# Patient Record
Sex: MALE | Race: WHITE | URBAN - METROPOLITAN AREA
[De-identification: names, ages, dates, MRNs, and addresses within clinical notes are randomized per-mention and may not be internally consistent; named-entity substitution may affect disease eponyms.]

---

## 2017-01-30 DIAGNOSIS — E78.5 HYPERLIPIDEMIA LDL GOAL <130: Primary | ICD-10-CM

## 2017-01-30 RX ORDER — ATORVASTATIN CALCIUM 40 MG/1
40 TABLET, FILM COATED ORAL DAILY
Qty: 90 TABLET | Refills: 0 | Status: SHIPPED | OUTPATIENT
Start: 2017-01-30 | End: 2017-04-29

## 2017-02-02 DIAGNOSIS — I10 HYPERTENSION GOAL BP (BLOOD PRESSURE) < 140/90: Primary | ICD-10-CM

## 2017-02-02 RX ORDER — AMLODIPINE BESYLATE 5 MG/1
5 TABLET ORAL DAILY
Qty: 90 TABLET | Refills: 1 | Status: SHIPPED | OUTPATIENT
Start: 2017-02-02 | End: 2017-08-21

## 2017-02-27 DIAGNOSIS — K29.70 GASTRITIS: ICD-10-CM

## 2017-02-27 DIAGNOSIS — I10 HYPERTENSION GOAL BP (BLOOD PRESSURE) < 140/90: ICD-10-CM

## 2017-02-27 RX ORDER — ATENOLOL 100 MG/1
100 TABLET ORAL DAILY
Qty: 90 TABLET | Refills: 1 | Status: SHIPPED | OUTPATIENT
Start: 2017-02-27 | End: 2017-08-21

## 2017-03-01 ENCOUNTER — OFFICE VISIT (OUTPATIENT)
Dept: OPTOMETRY | Facility: CLINIC | Age: 62
End: 2017-03-01
Payer: COMMERCIAL

## 2017-03-01 DIAGNOSIS — H25.041 CATARACT, POST SUBCAPSULAR POLAR SENILE, RIGHT: Primary | ICD-10-CM

## 2017-03-01 DIAGNOSIS — H52.203 ASTIGMATISM OF BOTH EYES: ICD-10-CM

## 2017-03-01 DIAGNOSIS — H52.4 PRESBYOPIA: ICD-10-CM

## 2017-03-01 DIAGNOSIS — H52.11 MYOPIA, RIGHT EYE: ICD-10-CM

## 2017-03-01 PROCEDURE — 92004 COMPRE OPH EXAM NEW PT 1/>: CPT | Performed by: OPTOMETRIST

## 2017-03-01 PROCEDURE — 92015 DETERMINE REFRACTIVE STATE: CPT | Performed by: OPTOMETRIST

## 2017-03-01 ASSESSMENT — REFRACTION_MANIFEST
OD_SPHERE: -6.75
OD_AXIS: 125
OS_CYLINDER: +0.50
OS_AXIS: 086
OS_CYLINDER: +0.50
OS_AXIS: 100
OD_AXIS: 124
OD_ADD: +2.50
OS_SPHERE: +0.25
OD_CYLINDER: +1.25
OD_SPHERE: -6.75
OS_ADD: +2.50
OS_SPHERE: PLANO
OD_CYLINDER: +1.00

## 2017-03-01 ASSESSMENT — TONOMETRY
IOP_METHOD: APPLANATION
OD_IOP_MMHG: 14
OS_IOP_MMHG: 12

## 2017-03-01 ASSESSMENT — EXTERNAL EXAM - RIGHT EYE: OD_EXAM: NORMAL

## 2017-03-01 ASSESSMENT — VISUAL ACUITY
OD_PH_SC: 20/150-1
OS_CC: 20/25
OD_SC: 20/200
CORRECTION_TYPE: GLASSES
OS_SC: 20/25
OS_SC+: -1
OD_SC+: -1
OD_CC: 20/200
METHOD: SNELLEN - LINEAR

## 2017-03-01 ASSESSMENT — EXTERNAL EXAM - LEFT EYE: OS_EXAM: NORMAL

## 2017-03-01 ASSESSMENT — CUP TO DISC RATIO
OS_RATIO: 0.3
OD_RATIO: 0.2

## 2017-03-01 ASSESSMENT — REFRACTION_WEARINGRX
OS_SPHERE: +0.75
OD_CYLINDER: SPHERE
SPECS_TYPE: PAL
OD_ADD: +2.00
OD_SPHERE: -1.50
OS_CYLINDER: SPHERE
OS_ADD: +2.00

## 2017-03-01 ASSESSMENT — KERATOMETRY
OD_K1POWER_DIOPTERS: 44.00
OD_AXISANGLE2_DEGREES: 22
OD_K2POWER_DIOPTERS: 44.50
OS_AXISANGLE2_DEGREES: 160
OS_K1POWER_DIOPTERS: 44.25
OS_K2POWER_DIOPTERS: 45.25

## 2017-03-01 ASSESSMENT — SLIT LAMP EXAM - LIDS
COMMENTS: NORMAL
COMMENTS: NORMAL

## 2017-03-01 ASSESSMENT — CONF VISUAL FIELD
OD_NORMAL: 1
OS_NORMAL: 1

## 2017-03-01 NOTE — PROGRESS NOTES
Chief Complaint   Patient presents with     COMPREHENSIVE EYE EXAM     concerns with cataract in right eye, new to eye dept.         Last Eye Exam: 1-2 years ago   Dilated Previously: Yes    What are you currently using to see?  Glasses, wears mostly for near. Needing them more and more. Rx is not working for his right eye any longer, blurry        Distance Vision Acuity: Noticed gradual change in right eye, blurry. Glasses don't seem to work. Does not wear the glasses to drive.    Near Vision Acuity: Needs glasses for near tasks, but has noticed that the glasses aren't working well any longer. Right eye is worse, has been told that he has a cataract in that eye     Eye Comfort: good. Did mention that his right eye seems to water in the morning -  Not bothersome  Do you use eye drops? 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       OBJECTIVE: See Ophthalmology exam    ASSESSMENT:    ICD-10-CM    1. Cataract, post subcapsular polar senile, right H25.041 EYE EXAM (SIMPLE-NONBILLABLE)     REFRACTIVE STATUS     OPHTHALMOLOGY ADULT REFERRAL   2. Astigmatism of both eyes H52.203 EYE EXAM (SIMPLE-NONBILLABLE)     REFRACTIVE STATUS     OPHTHALMOLOGY ADULT REFERRAL   3. Presbyopia H52.4 EYE EXAM (SIMPLE-NONBILLABLE)     REFRACTIVE STATUS     OPHTHALMOLOGY ADULT REFERRAL   4. Myopia, right eye H52.11 EYE EXAM (SIMPLE-NONBILLABLE)     REFRACTIVE STATUS     OPHTHALMOLOGY ADULT REFERRAL      PLAN:     Patient Instructions   Patient was advised of today's exam findings.  Advised not to update glasses   Return in 1 year for eye exam  Refer to [facility] [place] for cataract evaluation right eye  Their office will call you to schedule appointment.   Please call [phone] if you have not heard from them within 1 week.    Elia Chavez and  George  [facility]  [address] [place] [address]   [phone]    Blanka Huang O.D.  [facility]   [address] [place], [place] [id number] [phone]

## 2017-03-01 NOTE — MR AVS SNAPSHOT
After Visit Summary   3/1/2017    Ishmael Whitman    MRN: [id number]           Patient Information     Date Of Birth          1955        Visit Information        Provider Department      3/1/2017 9:30 AM Blanka Huang, OD [facility]        Today's Diagnoses     Cataract, post subcapsular polar senile, right    -  1    Astigmatism of both eyes        Presbyopia        Myopia, right eye          Care Instructions    Patient was advised of today's exam findings.  Advised not to update glasses   Return in 1 year for eye exam  Refer to [facility] at [place] for cataract evaluation right eye  Their office will call you to schedule appointment.   Please call [phone] if you have not heard from them within 1 week.    Elia Tobin  [facility]  [address] [place] [address] [phone]    Blanka Huang O.D.  [facility]   [address] [place] [address] [phone]            Follow-ups after your visit        Additional Services     OPHTHALMOLOGY ADULT REFERRAL       Your provider has referred you to:  [facility], [facility]  [phone]    [email or web address]        Please be aware that coverage of these services is subject to the terms and limitations of your health insurance plan.  Call member services at your health plan with any benefit or coverage questions.      Please bring the following to your appointment:  >>   Any x-rays, CTs or MRIs which have been performed.  Contact the facility where they were done to arrange for  prior to your scheduled appointment.  Any new CT, MRI or other procedures ordered by your specialist must be performed at a [facility] facility or coordinated by your clinic's referral office.    >>   List of current medications   >>   This referral request   >>   Any documents/labs given to you for this referral                   Who to contact     If you have questions or need follow up information about today's clinic visit or your schedule please contact [facility] directly at [phone].  Normal or non-critical lab and imaging results will be communicated to you by MyChart, letter or phone within 4 business days after the clinic has received the results. If you do not hear from us within 7 days, please contact the clinic through Thumbtackhart or phone. If you have a critical or abnormal lab result, we will notify you by phone as soon as possible.  Submit refill requests through Ziffi or call your pharmacy and they will forward the refill request to us. Please allow 3 business days for your refill to be completed.          Additional Information About Your Visit        Ziffi Information     Ziffi gives you secure access to your electronic health record. If you see a primary care provider, you can also send messages to your care team and make appointments. If you have questions, please call your primary care clinic.  If you do not have a primary care provider, please call [phone] and they will assist you.        Care EveryWhere ID     This is your Care EveryWhere ID. This could be used by other organizations to access your [facility] medical records  [id number]         Blood Pressure from Last 3 Encounters:   06/09/16 131/82   12/17/15 157/84   08/25/15 137/88    Weight from Last 3 Encounters:   06/09/16 94.3 kg (208 lb)   12/17/15 92.5 kg (204 lb)   08/25/15 93 kg (205 lb)              We Performed the Following     EYE EXAM (SIMPLE-NONBILLABLE)     OPHTHALMOLOGY ADULT REFERRAL     REFRACTIVE STATUS        Primary Care Provider Office Phone # Fax #    Giovanni Calixto [phone]       [facility] [address] [facility] [address]        Thank you!     Thank you for choosing [facility]  for your care. Our goal is always to provide you with excellent care.  Hearing back from our patients is one way we can continue to improve our services. Please take a few minutes to complete the written survey that you may receive in the mail after your visit with us. Thank you!             Your Updated Medication List - Protect others around you: Learn how to safely use, store and throw away your medicines at www.disposemymeds.org.          This list is accurate as of: 3/1/17 10:18 AM.  Always use your most recent med list.                   Brand Name Dispense Instructions for use    amLODIPine 5 MG tablet    NORVASC    90 tablet    Take 1 tablet (5 mg) by mouth daily for blood pressure take in AM       aspirin 81 MG tablet      Take 1 tablet by mouth daily.       atenolol 100 MG tablet    TENORMIN    90 tablet    Take 1 tablet (100 mg) by mouth daily At bedtime for blood pressure.       atorvastatin 40 MG tablet    LIPITOR    90 tablet    Take 1 tablet (40 mg) by mouth daily For Cholesterol       buPROPion 150 MG 12 hr tablet    WELLBUTRIN SR    60 tablet    Take 1 tablet once daily and increase to 1 tablet twice daily after  7 days for smoking.       ibuprofen 200 MG tablet    ADVIL/MOTRIN     200 mg. 4 tabs daily when needed       omeprazole 20 MG CR capsule    priLOSEC    90 capsule    Take 1 capsule (20 mg) by mouth daily as needed To protect stomach       triamterene-hydrochlorothiazide 37.5-25 MG per capsule    DYAZIDE    135 capsule    One tab blood pressure In AM (ok for one extra pill daily if needed for edema).       varenicline 0.5 MG X 11 & 1 MG X 42 tablet    CHANTIX STARTING MONTH MELECIO    53 tablet    Take 0.5 mg tab daily for 3 days, then 0.5 mg tab twice daily for 4 days, then 1 mg twice daily. For smoking

## 2017-03-01 NOTE — PATIENT INSTRUCTIONS
Patient was advised of today's exam findings.  Advised not to update glasses   Return in 1 year for eye exam  Refer to [facility] [place] for cataract evaluation right eye  Their office will call you to schedule appointment.   Please call [phone] if you have not heard from them within 1 week.    Elia Chavez and Geogre  [facility]  [address] [place] [id number] [phone]    Blanka Huang O.D.  [facility]   [address] [place], [place] [id number] [phone]

## 2017-04-04 DIAGNOSIS — I10 ESSENTIAL HYPERTENSION WITH GOAL BLOOD PRESSURE LESS THAN 140/90: ICD-10-CM

## 2017-04-06 RX ORDER — TRIAMTERENE AND HYDROCHLOROTHIAZIDE 37.5; 25 MG/1; MG/1
CAPSULE ORAL
Qty: 135 CAPSULE | Refills: 0 | Status: SHIPPED | OUTPATIENT
Start: 2017-04-06 | End: 2017-09-19

## 2017-04-13 ENCOUNTER — OFFICE VISIT (OUTPATIENT)
Dept: OPHTHALMOLOGY | Facility: CLINIC | Age: 62
End: 2017-04-13
Payer: COMMERCIAL

## 2017-04-13 ENCOUNTER — TELEPHONE (OUTPATIENT)
Dept: OPHTHALMOLOGY | Facility: CLINIC | Age: 62
End: 2017-04-13

## 2017-04-13 DIAGNOSIS — H25.813 COMBINED FORM OF AGE-RELATED CATARACT, BOTH EYES: Primary | ICD-10-CM

## 2017-04-13 PROCEDURE — 92004 COMPRE OPH EXAM NEW PT 1/>: CPT | Performed by: STUDENT IN AN ORGANIZED HEALTH CARE EDUCATION/TRAINING PROGRAM

## 2017-04-13 RX ORDER — MONOCHLOROACETIC ACID
1 CRYSTALS MISCELLANEOUS 4 TIMES DAILY
Qty: 1 BOTTLE | Refills: 0 | Status: SHIPPED | OUTPATIENT
Start: 2017-05-16 | End: 2017-04-13

## 2017-04-13 RX ORDER — BROMFENAC SODIUM 100 %
1 POWDER (GRAM) MISCELLANEOUS 4 TIMES DAILY
Qty: 1 BOTTLE | Refills: 0 | Status: SHIPPED | OUTPATIENT
Start: 2017-04-13 | End: 2018-11-30

## 2017-04-13 RX ORDER — MONOCHLOROACETIC ACID
1 CRYSTALS MISCELLANEOUS 4 TIMES DAILY
Qty: 1 BOTTLE | Refills: 0 | Status: SHIPPED | OUTPATIENT
Start: 2017-04-30 | End: 2018-11-30

## 2017-04-13 RX ORDER — BROMFENAC SODIUM 100 %
1 POWDER (GRAM) MISCELLANEOUS 4 TIMES DAILY
Qty: 1 BOTTLE | Refills: 0 | Status: SHIPPED | OUTPATIENT
Start: 2017-04-13 | End: 2017-04-13

## 2017-04-13 RX ORDER — DEXAMETHASONE ACETATE, MICRO 100 %
1 POWDER (GRAM) MISCELLANEOUS 4 TIMES DAILY
Qty: 1 BOTTLE | Refills: 0 | Status: SHIPPED | OUTPATIENT
Start: 2017-04-13 | End: 2018-11-30

## 2017-04-13 RX ORDER — DEXAMETHASONE ACETATE, MICRO 100 %
1 POWDER (GRAM) MISCELLANEOUS 4 TIMES DAILY
Qty: 1 BOTTLE | Refills: 0 | Status: SHIPPED | OUTPATIENT
Start: 2017-04-13 | End: 2017-04-13

## 2017-04-13 ASSESSMENT — REFRACTION_MANIFEST
OD_CYLINDER: SPHERE
OD_SPHERE: -2.50

## 2017-04-13 ASSESSMENT — CUP TO DISC RATIO
OD_RATIO: 0.2
OS_RATIO: 0.2

## 2017-04-13 ASSESSMENT — REFRACTION_WEARINGRX
OD_ADD: +2.00
OS_SPHERE: +0.75
SPECS_TYPE: PAL
OS_ADD: +2.00
OS_CYLINDER: SPHERE
OD_SPHERE: -1.50
OD_CYLINDER: SPHERE

## 2017-04-13 ASSESSMENT — TONOMETRY
IOP_METHOD: APPLANATION
OS_IOP_MMHG: 14
OD_IOP_MMHG: 15

## 2017-04-13 ASSESSMENT — VISUAL ACUITY
OD_CC: 20/100
METHOD: SNELLEN - LINEAR
OS_CC: 20/20

## 2017-04-13 ASSESSMENT — SLIT LAMP EXAM - LIDS
COMMENTS: NORMAL
COMMENTS: NORMAL

## 2017-04-13 ASSESSMENT — EXTERNAL EXAM - RIGHT EYE: OD_EXAM: NORMAL

## 2017-04-13 ASSESSMENT — EXTERNAL EXAM - LEFT EYE: OS_EXAM: NORMAL

## 2017-04-13 NOTE — PATIENT INSTRUCTIONS
Visually significant cataract that is interfering with daily activities of living. Plan for cataract extraction and intraocular lens implant right eye.  Risks, benefits, complications, and alternatives discussed with patient including possibility of limitations from coexistent eye disease and loss of vision. Target refraction and lens options discussed.  Patient understands and wishes to proceed with surgery.    Jordana Vazquez MD  [phone]

## 2017-04-13 NOTE — PROGRESS NOTES
Current Eye Medications:  no     Subjective:  Cataract evaluation, Dr. Huang referral.  Pt reports his  Vision right  is very blurry, change in vision has been gradual over about the past year and a half. But the vision has really gotten worse in the past 6 months in the right eye.     Objective:  See Ophthalmology Exam.      Assessment:  Ishmael Whitman is a 62 year old male who presents with:     Combined form of age-related cataract, both eyes Visually significant cataract right eye with myopic shift.       Plan:  Visually significant cataract that is interfering with daily activities of living. Plan for cataract extraction and intraocular lens implant right eye.  Risks, benefits, complications, and alternatives discussed with patient including possibility of limitations from coexistent eye disease and loss of vision. Target refraction and lens options discussed.  Patient understands and wishes to proceed with surgery.    Visually significant cataract, Right  eye. Risks, benefits, alternatives of cataract surgery discussed; patient wishes to proceed with surgery.    Eye: Right   Pupil: 6.5  Pseudoexfoliation: No  Flomax: No  Diabetes mellitus: No  Glaucoma: No  Guttae: No  S/p refractive surgery or other eye surgery: No  Refractive target:  emmetropia - discuss (says he only used cheaters prior to developing cataract)  Anticoagulation: aspirin  Anesthesia: MAC/topical  Able to lay flat:  Yes  Additional concerns:   Difficulty: 3      Jordana Vazquez MD  [phone]

## 2017-04-13 NOTE — TELEPHONE ENCOUNTER
Tammy from [facility] called regarding an eye drop that was sent to the [facility] that they cannot fill. The Pharmacist has some questions regarding the prescription and would like a call back.    Please call [phone]. Thank you.

## 2017-04-13 NOTE — MR AVS SNAPSHOT
After Visit Summary   4/13/2017    Ishmael Whitman    MRN: [id number]           Patient Information     Date Of Birth          1955        Visit Information        Provider Department      4/13/2017 8:45 AM Jordana Vazquez MD [facility]        Today's Diagnoses     Combined form of age-related cataract, both eyes    -  1      Care Instructions    Visually significant cataract that is interfering with daily activities of living. Plan for cataract extraction and intraocular lens implant right eye.  Risks, benefits, complications, and alternatives discussed with patient including possibility of limitations from coexistent eye disease and loss of vision. Target refraction and lens options discussed.  Patient understands and wishes to proceed with surgery.    Jordana Vazquez MD  [phone]            Follow-ups after your visit        Who to contact     If you have questions or need follow up information about today's clinic visit or your schedule please contact [facility] directly at [phone].  Normal or non-critical lab and imaging results will be communicated to you by ScaleMPhart, letter or phone within 4 business days after the clinic has received the results. If you do not hear from us within 7 days, please contact the clinic through ScaleMPhart or phone. If you have a critical or abnormal lab result, we will notify you by phone as soon as possible.  Submit refill requests through Fiiiling or call your pharmacy and they will forward the refill request to us. Please allow 3 business days for your refill to be completed.          Additional Information About Your Visit        MyChart Information     Fiiiling gives you secure access to your electronic health record. If you see a primary care provider, you can also send messages to your care team and make appointments. If you have questions, please call your primary care clinic.  If you do not have a primary care  provider, please call [phone] and they will assist you.        Care EveryWhere ID     This is your Care EveryWhere ID. This could be used by other organizations to access your [facility] medical records  [id number]         Blood Pressure from Last 3 Encounters:   06/09/16 131/82   12/17/15 157/84   08/25/15 137/88    Weight from Last 3 Encounters:   06/09/16 94.3 kg (208 lb)   12/17/15 92.5 kg (204 lb)   08/25/15 93 kg (205 lb)              Today, you had the following     No orders found for display       Primary Care Provider Office Phone # Fax #    Giovanni Calixto [phone]       [facility] [address] [address]        Thank you!     Thank you for choosing [facility]  for your care. Our goal is always to provide you with excellent care. Hearing back from our patients is one way we can continue to improve our services. Please take a few minutes to complete the written survey that you may receive in the mail after your visit with us. Thank you!             Your Updated Medication List - Protect others around you: Learn how to safely use, store and throw away your medicines at www.disposemymeds.org.          This list is accurate as of: 4/13/17  9:25 AM.  Always use your most recent med list.                   Brand Name Dispense Instructions for use    amLODIPine 5 MG tablet    NORVASC    90 tablet    Take 1 tablet (5 mg) by mouth daily for blood pressure take in AM       aspirin 81 MG tablet      Take 1 tablet by mouth daily.       atenolol 100 MG tablet    TENORMIN    90 tablet    Take 1 tablet (100 mg) by mouth daily At bedtime for blood pressure.       atorvastatin 40 MG tablet    LIPITOR    90 tablet    Take 1 tablet (40 mg) by mouth daily For Cholesterol       buPROPion 150 MG 12 hr tablet    WELLBUTRIN SR    60 tablet    Take 1 tablet once daily and increase to 1 tablet twice daily after  7 days for smoking.       ibuprofen 200 MG tablet     ADVIL/MOTRIN     200 mg. 4 tabs daily when needed       omeprazole 20 MG CR capsule    priLOSEC    90 capsule    Take 1 capsule (20 mg) by mouth daily as needed To protect stomach       triamterene-hydrochlorothiazide 37.5-25 MG per capsule    DYAZIDE    135 capsule    TAKE ONE TABLET DAILY IN MORING (OK FOR ONE EXTRA PILL DAILY IF NEEDED FOR EDEMA).       varenicline 0.5 MG X 11 & 1 MG X 42 tablet    CHANTIX STARTING MONTH MELECIO    53 tablet    Take 0.5 mg tab daily for 3 days, then 0.5 mg tab twice daily for 4 days, then 1 mg twice daily. For smoking

## 2017-04-13 NOTE — TELEPHONE ENCOUNTER
Called and left message that this was ordered in error to the [facility] pharmacy.

## 2017-04-20 ENCOUNTER — TELEPHONE (OUTPATIENT)
Dept: OPHTHALMOLOGY | Facility: CLINIC | Age: 62
End: 2017-04-20

## 2017-04-20 NOTE — TELEPHONE ENCOUNTER
Patient is scheduled for cataract surgery with Dr. Vazquez 5/17/17. The patient has some questions about the eye medication prescribed by Dr. Vazquez.    Moxifloxacin HCl POWD    Dexamethasone Acetate POWD    Bromfenac Sodium POWD    Please contact the patient at home please. Thank you.

## 2017-05-04 ENCOUNTER — OFFICE VISIT (OUTPATIENT)
Dept: FAMILY MEDICINE | Facility: CLINIC | Age: 62
End: 2017-05-04
Payer: COMMERCIAL

## 2017-05-04 VITALS
HEIGHT: 68 IN | TEMPERATURE: 98.4 F | WEIGHT: 195 LBS | HEART RATE: 68 BPM | SYSTOLIC BLOOD PRESSURE: 130 MMHG | BODY MASS INDEX: 29.55 KG/M2 | OXYGEN SATURATION: 97 % | DIASTOLIC BLOOD PRESSURE: 80 MMHG

## 2017-05-04 DIAGNOSIS — H25.9: ICD-10-CM

## 2017-05-04 DIAGNOSIS — K29.50 OTHER CHRONIC GASTRITIS: ICD-10-CM

## 2017-05-04 DIAGNOSIS — Z12.11 COLON CANCER SCREENING: ICD-10-CM

## 2017-05-04 DIAGNOSIS — Z01.818 PREOP GENERAL PHYSICAL EXAM: Primary | ICD-10-CM

## 2017-05-04 DIAGNOSIS — E78.5 HYPERLIPIDEMIA LDL GOAL <130: ICD-10-CM

## 2017-05-04 DIAGNOSIS — I10 ESSENTIAL HYPERTENSION WITH GOAL BLOOD PRESSURE LESS THAN 140/90: ICD-10-CM

## 2017-05-04 LAB
ALBUMIN SERPL-MCNC: 3.3 G/DL (ref 3.4–5)
ANION GAP SERPL CALCULATED.3IONS-SCNC: 8 MMOL/L (ref 3–14)
BUN SERPL-MCNC: 16 MG/DL (ref 7–30)
CALCIUM SERPL-MCNC: 9.2 MG/DL (ref 8.5–10.1)
CHLORIDE SERPL-SCNC: 103 MMOL/L (ref 94–109)
CHOLEST SERPL-MCNC: 135 MG/DL
CO2 SERPL-SCNC: 25 MMOL/L (ref 20–32)
CREAT SERPL-MCNC: 0.85 MG/DL (ref 0.66–1.25)
ERYTHROCYTE [DISTWIDTH] IN BLOOD BY AUTOMATED COUNT: 15.2 % (ref 10–15)
GFR SERPL CREATININE-BSD FRML MDRD: ABNORMAL ML/MIN/1.7M2
GLUCOSE SERPL-MCNC: 99 MG/DL (ref 70–99)
HCT VFR BLD AUTO: 37.7 % (ref 40–53)
HDLC SERPL-MCNC: 47 MG/DL
HGB BLD-MCNC: 12.4 G/DL (ref 13.3–17.7)
LDLC SERPL CALC-MCNC: 73 MG/DL
MCH RBC QN AUTO: 31.3 PG (ref 26.5–33)
MCHC RBC AUTO-ENTMCNC: 32.9 G/DL (ref 31.5–36.5)
MCV RBC AUTO: 95 FL (ref 78–100)
NONHDLC SERPL-MCNC: 88 MG/DL
PHOSPHATE SERPL-MCNC: 2.9 MG/DL (ref 2.5–4.5)
PLATELET # BLD AUTO: 223 10E9/L (ref 150–450)
POTASSIUM SERPL-SCNC: 4.3 MMOL/L (ref 3.4–5.3)
RBC # BLD AUTO: 3.96 10E12/L (ref 4.4–5.9)
SODIUM SERPL-SCNC: 136 MMOL/L (ref 133–144)
TRIGL SERPL-MCNC: 74 MG/DL
WBC # BLD AUTO: 6.5 10E9/L (ref 4–11)

## 2017-05-04 PROCEDURE — 36415 COLL VENOUS BLD VENIPUNCTURE: CPT | Performed by: FAMILY MEDICINE

## 2017-05-04 PROCEDURE — 99214 OFFICE O/P EST MOD 30 MIN: CPT | Performed by: FAMILY MEDICINE

## 2017-05-04 PROCEDURE — 80069 RENAL FUNCTION PANEL: CPT | Performed by: FAMILY MEDICINE

## 2017-05-04 PROCEDURE — 80061 LIPID PANEL: CPT | Performed by: FAMILY MEDICINE

## 2017-05-04 PROCEDURE — 85027 COMPLETE CBC AUTOMATED: CPT | Performed by: FAMILY MEDICINE

## 2017-05-04 NOTE — MR AVS SNAPSHOT
After Visit Summary   5/4/2017    Ishmael Whitman    MRN: [id number]           Patient Information     Date Of Birth          1955        Visit Information        Provider Department      5/4/2017 8:20 AM Giovanni Calixto MD [facility]        Today's Diagnoses     Preop general physical exam    -  1    Senile cataract, unspecified laterality        Essential hypertension with goal blood pressure less than 140/90        Hyperlipidemia LDL goal <130        Other chronic gastritis        Colon cancer screening          Care Instructions      Before Your Surgery      Call your surgeon if there is any change in your health. This includes signs of a cold or flu (such as a sore throat, runny nose, cough, rash or fever).    Do not smoke, drink alcohol or take over the counter medicine (unless your surgeon or primary care doctor tells you to) for the 24 hours before and after surgery.    If you take prescribed drugs: Follow your doctor s orders about which medicines to take and which to stop until after surgery.    Eating and drinking prior to surgery: follow the instructions from your surgeon    Take a shower or bath the night before surgery. Use the soap your surgeon gave you to gently clean your skin. If you do not have soap from your surgeon, use your regular soap. Do not shave or scrub the surgery site.  Wear clean pajamas and have clean sheets on your bed.         Follow-ups after your visit        Additional Services     GASTROENTEROLOGY ADULT REF PROCEDURE ONLY       Last Lab Result: Creatinine (mg/dL)       Date                     Value                 06/09/2016               0.85             ----------  Body mass index is 29.65 kg/(m^2).     Needed:  No  Language:  English    Patient will be contacted to schedule procedure.     Please be aware that coverage of these services is subject to the terms and limitations of your health insurance plan.  Call member services  at your health plan with any benefit or coverage questions.  Any procedures must be performed at a [facility] facility OR coordinated by your clinic's referral office.    Please bring the following with you to your appointment:    (1) Any X-Rays, CTs or MRIs which have been performed.  Contact the facility where they were done to arrange for  prior to your scheduled appointment.    (2) List of current medications   (3) This referral request   (4) Any documents/labs given to you for this referral                  Your next 10 appointments already scheduled     May 05, 2017  8:15 AM CDT   Return Visit with Jordana Vazquez MD   [facility]    [address] [address] [phone]            May 17, 2017   Procedure with Jordana Vazquez MD   [facility] (--)    [address]  [facility] [address]   [phone]            May 18, 2017  7:45 AM CDT   Return Visit with Jordana Vazquez MD   [facility])    [address] [address]   [phone]            May 25, 2017  7:45 AM CDT   Return Visit with Jordana Vazquez MD   [facility])    [address] [address]   [phone]            Jun 28, 2017  8:15 AM CDT   Return Visit with Jordana Vazquez MD   [facility])    [address] [address]   [phone]              Who to contact     If you have questions or need follow up information about today's clinic visit or your schedule please contact [facility] directly at [phone].  Normal or non-critical lab and imaging results will be communicated to you by MyChart, letter or phone within 4 business days after the clinic has received the results. If you do not hear from us within 7 days, please contact the clinic  "through W5 Networks or phone. If you have a critical or abnormal lab result, we will notify you by phone as soon as possible.  Submit refill requests through W5 Networks or call your pharmacy and they will forward the refill request to us. Please allow 3 business days for your refill to be completed.          Additional Information About Your Visit        TeachScapeharEdventory Information     W5 Networks gives you secure access to your electronic health record. If you see a primary care provider, you can also send messages to your care team and make appointments. If you have questions, please call your primary care clinic.  If you do not have a primary care provider, please call [phone] and they will assist you.        Care EveryWhere ID     This is your Care EveryWhere ID. This could be used by other organizations to access your [facility] medical records  [id number]        Your Vitals Were     Pulse Temperature Height Pulse Oximetry BMI (Body Mass Index)       68 98.4  F (36.9  C) (Oral) 5' 8\" (1.727 m) 97% 29.65 kg/m2        Blood Pressure from Last 3 Encounters:   05/04/17 130/80   06/09/16 131/82   12/17/15 157/84    Weight from Last 3 Encounters:   05/04/17 195 lb (88.5 kg)   06/09/16 208 lb (94.3 kg)   12/17/15 204 lb (92.5 kg)              We Performed the Following     CBC with platelets     GASTROENTEROLOGY ADULT REF PROCEDURE ONLY     Lipid Profile (Chol, Trig, HDL, LDL calc)     Renal panel (Alb, BUN, Ca, Cl, CO2, Creat, Gluc, Phos, K, Na)          Today's Medication Changes          These changes are accurate as of: 5/4/17  9:20 AM.  If you have any questions, ask your nurse or doctor.               Stop taking these medicines if you haven't already. Please contact your care team if you have questions.     buPROPion 150 MG 12 hr tablet   Commonly known as:  WELLBUTRIN SR   Stopped by:  Giovanni Calixto MD                    Primary Care Provider Office Phone # Fax #    Giovanni Calixto [phone]       " [facility] [address] [facility] [address]        Thank you!     Thank you for choosing [facility]  for your care. Our goal is always to provide you with excellent care. Hearing back from our patients is one way we can continue to improve our services. Please take a few minutes to complete the written survey that you may receive in the mail after your visit with us. Thank you!             Your Updated Medication List - Protect others around you: Learn how to safely use, store and throw away your medicines at www.disposemymeds.org.          This list is accurate as of: 5/4/17  9:20 AM.  Always use your most recent med list.                   Brand Name Dispense Instructions for use    amLODIPine 5 MG tablet    NORVASC    90 tablet    Take 1 tablet (5 mg) by mouth daily for blood pressure take in AM       aspirin 81 MG tablet      Take 1 tablet by mouth daily.       atenolol 100 MG tablet    TENORMIN    90 tablet    Take 1 tablet (100 mg) by mouth daily At bedtime for blood pressure.       atorvastatin 40 MG tablet    LIPITOR    90 tablet    TAKE 1 TABLET BY MOUTH EVERY DAY FOR CHOLESTEROL       Bromfenac Sodium Powd     1 Bottle    1 Bottle 4 times daily 1 drop four times a day into the operative eye starting 1 day before surgery. Use until bottle runs out.       Dexamethasone Acetate Powd     1 Bottle    1 Bottle 4 times daily 1 drop four times a day into the operative eye starting 1 day before surgery. Use until bottle runs out.       ibuprofen 200 MG tablet    ADVIL/MOTRIN     200 mg. 4 tabs daily when needed       Moxifloxacin HCl Powd     1 Bottle    1 Bottle 4 times daily 1 drop four times a day into the operative eye starting 1 day before surgery. Use until bottle runs out.       omeprazole 20 MG CR capsule    priLOSEC    90 capsule    Take 1 capsule (20 mg) by mouth daily as needed To protect stomach       triamterene-hydrochlorothiazide 37.5-25 MG per capsule    DYAZIDE     135 capsule    TAKE ONE TABLET DAILY IN MORING (OK FOR ONE EXTRA PILL DAILY IF NEEDED FOR EDEMA).

## 2017-05-04 NOTE — PROGRESS NOTES
[facility]  [address] [facility] [address]  [phone]  Dept: [phone]    PRE-OP EVALUATION:  Today's date: 2017    Ishmael Whitman (: 1955) presents for pre-operative evaluation assessment as requested by Dr. Vazquez.  He requires evaluation and anesthesia risk assessment prior to undergoing surgery/procedure for treatment of right eye .  Proposed procedure: Cataract    Date of Surgery/ Procedure: 2017  Time of Surgery/ Procedure: 1:30 pm  Hospital/Surgical Facility: [facility]    Primary Physician: Giovanni Calixto  Type of Anesthesia Anticipated: to be determined    Patient has a Health Care Directive or Living Will:  NO    1. NO - Do you have a history of heart attack, stroke, stent, bypass or surgery on an artery in the head, neck, heart or legs?  2. NO - Do you ever have any pain or discomfort in your chest?  3. NO - Do you have a history of  Heart Failure?  4. NO - Are you troubled by shortness of breath when: walking on the level, up a slight hill or at night?  5. NO - Do you currently have a cold, bronchitis or other respiratory infection?  6. NO - Do you have a cough, shortness of breath or wheezing?  7. NO - Do you sometimes get pains in the calves of your legs when you walk?  8. NO - Do you or anyone in your family have previous history of blood clots?  9. NO - Do you or does anyone in your family have a serious bleeding problem such as prolonged bleeding following surgeries or cuts?  10. NO - Have you ever had problems with anemia or been told to take iron pills?  11. NO - Have you had any abnormal blood loss such as black, tarry or bloody stools, or abnormal vaginal bleeding?  12. NO - Have you ever had a blood transfusion?  13. NO - Have you or any of your relatives ever had problems with anesthesia?  14. NO - Do you have sleep apnea, excessive snoring or daytime drowsiness?  15. NO - Do you have any prosthetic heart valves?  16. yes - Do  you have prosthetic joints? Right hip   17. NO - Is there any chance that you may be pregnant?      HPI:                                                      Brief HPI related to upcoming procedure: cataract  Blood pressure - no outside blood pressure. Active.     HYPERTENSION - Patient has longstanding history of mod-severe HTN , currently denies any symptoms referable to elevated blood pressure. Specifically denies chest pain, palpitations, dyspnea, orthopnea, PND or peripheral edema. Blood pressure readings have been in normal range. Current medication regimen is as listed below. Patient denies any side effects of medication.                                                                                                                                                                                          .  HYPERLIPIDEMIA - Patient has a long history of significant Hyperlipidemia requiring medication for treatment with recent good control. Patient reports no problems or side effects with the medication.                                                                                                                                                       .    MEDICAL HISTORY:                                                      Patient Active Problem List    Diagnosis Date Noted     Cataract, post subcapsular polar senile, right 03/01/2017     Priority: Medium     Essential hypertension with goal blood pressure less than 140/90 06/09/2016     Priority: Medium     Peripheral edema 08/25/2015     Priority: Medium     Chronic knee pain 08/07/2014     Priority: Medium     Manzanares's esophagus 08/27/2012     Priority: Medium     Microhematuria 08/27/2012     Priority: Medium     Elevated ferritin level 08/27/2012     Priority: Medium     Advanced directives, counseling/discussion 06/03/2011     Priority: Medium     Discussed Advance Directive planning with patient; information given to patient to review.            Anemia 06/03/2011     Priority: Medium     Gastritis 06/03/2011     Priority: Medium     BPH (benign prostatic hyperplasia) 06/03/2011     Priority: Medium     Tobacco abuse 06/03/2011     Priority: Medium     BMI 29.0-29.9,adult 06/03/2011     Priority: Medium     Hypertension goal BP (blood pressure) < 140/90 05/10/2011     Priority: Medium     HYPERLIPIDEMIA LDL GOAL <130 10/31/2010     Priority: Medium     Chronic arthritis 04/20/2009     Priority: Medium     (Problem list name updated by automated process. Provider to review and confirm.)       Olecranon bursitis 04/20/2009     Priority: Medium      Past Medical History:   Diagnosis Date     DJD (degenerative joint disease)     knees     HTN      Nonsenile cataract      Pure hypercholesterolemia      Past Surgical History:   Procedure Laterality Date     COLONOSCOPY  10/28/2011    Procedure:COLONOSCOPY; Colonoscopy, history of polyps, anemia; Surgeon:RIOS NOVA; Location:[facility] OR     ORTHOPEDIC SURGERY  10.20.14    right hip replacement     VASECTOMY       Current Outpatient Prescriptions   Medication Sig Dispense Refill     atorvastatin (LIPITOR) 40 MG tablet TAKE 1 TABLET BY MOUTH EVERY DAY FOR CHOLESTEROL 90 tablet 0     Bromfenac Sodium POWD 1 Bottle 4 times daily 1 drop four times a day into the operative eye starting 1 day before surgery. Use until bottle runs out. 1 Bottle 0     Dexamethasone Acetate POWD 1 Bottle 4 times daily 1 drop four times a day into the operative eye starting 1 day before surgery. Use until bottle runs out. 1 Bottle 0     Moxifloxacin HCl POWD 1 Bottle 4 times daily 1 drop four times a day into the operative eye starting 1 day before surgery. Use until bottle runs out. 1 Bottle 0     triamterene-hydrochlorothiazide (DYAZIDE) 37.5-25 MG per capsule TAKE ONE TABLET DAILY IN MORING (OK FOR ONE EXTRA PILL DAILY IF NEEDED FOR EDEMA). 135 capsule 0     omeprazole (PRILOSEC) 20 MG CR capsule Take 1 capsule (20 mg) by mouth daily as  needed To protect stomach 90 capsule 1     atenolol (TENORMIN) 100 MG tablet Take 1 tablet (100 mg) by mouth daily At bedtime for blood pressure. 90 tablet 1     amLODIPine (NORVASC) 5 MG tablet Take 1 tablet (5 mg) by mouth daily for blood pressure take in AM 90 tablet 1     buPROPion (WELLBUTRIN SR) 150 MG 12 hr tablet Take 1 tablet once daily and increase to 1 tablet twice daily after  7 days for smoking. 60 tablet 5     varenicline (CHANTIX STARTING MONTH PAK) 0.5 MG X 11 & 1 MG X 42 tablet Take 0.5 mg tab daily for 3 days, then 0.5 mg tab twice daily for 4 days, then 1 mg twice daily. For smoking 53 tablet 5     aspirin 81 MG tablet Take 1 tablet by mouth daily.       ibuprofen (ADVIL,MOTRIN) 200 MG tablet 200 mg. 4 tabs daily when needed       OTC products: None, except as noted above    No Known Allergies   Latex Allergy: NO    Social History   Substance Use Topics     Smoking status: Current Every Day Smoker     Packs/day: 1.00     Years: 30.00     Types: Cigarettes     Smokeless tobacco: Never Used     Alcohol use Yes     History   Drug Use No       REVIEW OF SYSTEMS:                                                    C: NEGATIVE for fever, chills, change in weight  INTEGUMENTARY/SKIN: NEGATIVE for worrisome rashes, moles or lesions  E/M: NEGATIVE for ear, mouth and throat problems  R: NEGATIVE for significant cough or SHORTNESS OF BREATH, smoking 1/2 ppd - cutting down - not interested med.  CV: NEGATIVE for chest pain, palpitations or peripheral edema, good exercise tolerance  GI: NEGATIVE for nausea, abdominal pain,+ heartburn stable, or change in bowel habits. Not taking vitaminD but balanced diet.   : negative for dysuria, hematuria, decreased urinary stream,  MUSCULOSKELETAL: NEGATIVE for significant arthralgias or myalgia  NEURO: NEGATIVE for weakness, dizziness or paresthesias  ENDOCRINE: NEGATIVE for temperature intolerance, skin/hair changes  HEME/ALLERGY/IMMUNE: NEGATIVE for bleeding  "problems  PSYCHIATRIC: NEGATIVE for changes in mood or affect    EXAM:                                                    There were no vitals taken for this visit.   /80  Pulse 68  Temp 98.4  F (36.9  C) (Oral)  Ht 5' 8\" (1.727 m)  Wt 195 lb (88.5 kg)  SpO2 97%  BMI 29.65 kg/m2    GENERAL APPEARANCE: healthy, alert and no distress  HENT: ear canals and TM's normal and nose and mouth without ulcers or lesions  NECK: no adenopathy, no asymmetry, masses, or scars and thyroid normal to palpation  RESP: lungs clear to auscultation - no rales, rhonchi or wheezes  CV: regular rate and rhythm, normal S1 S2, no S3 or S4 and no murmur, click or rub   ABDOMEN: soft, nontender, no HSM or masses and bowel sounds normal  MS: extremities normal- no gross deformities noted  NEURO: Normal strength and tone, sensory exam grossly normal, mentation intact and speech normal  PSYCH: mentation appears normal and affect normal/bright    DIAGNOSTICS:                                                      Labs Resulted Today:   Results for orders placed or performed in visit on 06/09/16   PSA, screen   Result Value Ref Range    PSA 0.46 0 - 4 ug/L   Renal panel (Alb, BUN, Ca, Cl, CO2, Creat, Gluc, Phos, K, Na)   Result Value Ref Range    Sodium 138 133 - 144 mmol/L    Potassium 4.1 3.4 - 5.3 mmol/L    Chloride 105 94 - 109 mmol/L    Carbon Dioxide 26 20 - 32 mmol/L    Anion Gap 7 3 - 14 mmol/L    Glucose 96 70 - 99 mg/dL    Urea Nitrogen 15 7 - 30 mg/dL    Creatinine 0.85 0.66 - 1.25 mg/dL    GFR Estimate >90  Non  GFR Calc   >60 mL/min/1.7m2    GFR Estimate If Black >90   GFR Calc   >60 mL/min/1.7m2    Calcium 8.4 (L) 8.5 - 10.1 mg/dL    Phosphorus 2.8 2.5 - 4.5 mg/dL    Albumin 3.4 3.4 - 5.0 g/dL     Labs Drawn and in Process:   Unresulted Labs Ordered in the Past 30 Days of this Admission     No orders found from 3/5/2017 to 5/5/2017.          Recent Labs   Lab Test  06/09/16   0916  08/18/15   " 0823 11/12/14 11/06/14 09/30/14   1021   HGB   --   14.0   --    --    --   13.5   PLT   --   213   --    --    --   178   INR   --    --   3.0*  2.6*   < >   --    NA  138  137   --    --    --    --    POTASSIUM  4.1  4.5   --    --    --   4.2   CR  0.85  0.94   --    --    --    --     < > = values in this interval not displayed.   HGB=12.4    IMPRESSION:                                                    Reason for surgery/procedure: cataract// repair  Diagnosis/reason for consult: htn/high cholesterol/cataract/gastritis// fitness for surgery  Ok for low risk surgery. Denies chest pain or shortness of breath. Blood pressure ok but still smoking.    ASSESSMENT / PLAN:  (I10) Essential hypertension with goal blood pressure less than 140/90  Comment: stable  Plan: Renal panel (Alb, BUN, Ca, Cl, CO2, Creat,         Gluc, Phos, K, Na)        Quit smoking. Continue exercise/diet. Chest pain or shortness of breath to er    (E78.5) Hyperlipidemia LDL goal <130  Plan: Lipid Profile (Chol, Trig, HDL, LDL calc)        Continue lipitor    (K29.50) Other chronic gastritis  Comment: hgb a little loww  Plan: CBC with platelets        Repeat egd. More iron in diet.. Continue prilosec. [id number] ROSAURA daily.     (Z12.11) Colon cancer screening  Comment: overdue. History polyps 2011  Plan: GASTROENTEROLOGY ADULT REF PROCEDURE ONLY        Back to [facility] this summer.    The proposed surgical procedure is considered LOW risk.    REVISED CARDIAC RISK INDEX  The patient has the following serious cardiovascular risks for perioperative complications such as (MI, PE, VFib and 3  AV Block):  No serious cardiac risks  INTERPRETATION: 1 risks: Class II (low risk - 0.9% complication rate)    The patient has the following additional risks for perioperative complications:  No identified additional risks      RECOMMENDATIONS:                                                        --Patient is to take all scheduled medications on the day of  surgery EXCEPT for modifications listed below.    APPROVAL GIVEN to proceed with proposed procedure, without further diagnostic evaluation       Signed Electronically by: Giovanni Calixto MD    Copy of this evaluation report is provided to requesting physician.    [facility] Preop Guidelines

## 2017-05-04 NOTE — NURSING NOTE
"Chief Complaint   Patient presents with     Pre-Op Exam       Initial /80  Pulse 68  Temp 98.4  F (36.9  C) (Oral)  Ht 5' 8\" (1.727 m)  Wt 195 lb (88.5 kg)  SpO2 97%  BMI 29.65 kg/m2 Estimated body mass index is 29.65 kg/(m^2) as calculated from the following:    Height as of this encounter: 5' 8\" (1.727 m).    Weight as of this encounter: 195 lb (88.5 kg).  Medication Reconciliation: complete   Blanca Plaza CMA    "

## 2017-05-05 ENCOUNTER — OFFICE VISIT (OUTPATIENT)
Dept: OPHTHALMOLOGY | Facility: CLINIC | Age: 62
End: 2017-05-05
Payer: COMMERCIAL

## 2017-05-05 DIAGNOSIS — H25.813 COMBINED FORM OF AGE-RELATED CATARACT, BOTH EYES: Primary | ICD-10-CM

## 2017-05-05 PROCEDURE — 92136 OPHTHALMIC BIOMETRY: CPT | Mod: TC | Performed by: STUDENT IN AN ORGANIZED HEALTH CARE EDUCATION/TRAINING PROGRAM

## 2017-05-05 PROCEDURE — 92012 INTRM OPH EXAM EST PATIENT: CPT | Performed by: STUDENT IN AN ORGANIZED HEALTH CARE EDUCATION/TRAINING PROGRAM

## 2017-05-05 ASSESSMENT — SLIT LAMP EXAM - LIDS
COMMENTS: NORMAL
COMMENTS: NORMAL

## 2017-05-05 ASSESSMENT — VISUAL ACUITY
OD_CC: 20/200
METHOD: SNELLEN - LINEAR
CORRECTION_TYPE: GLASSES

## 2017-05-05 ASSESSMENT — EXTERNAL EXAM - RIGHT EYE: OD_EXAM: NORMAL

## 2017-05-05 ASSESSMENT — TONOMETRY
IOP_METHOD: TONOPEN
OD_IOP_MMHG: 17

## 2017-05-05 ASSESSMENT — EXTERNAL EXAM - LEFT EYE: OS_EXAM: NORMAL

## 2017-05-05 NOTE — PATIENT INSTRUCTIONS
PRE-OP CATARACT INSTRUCTIONS    *Use the following drops in the right eye 4 times on the day before surgery and once the morning of surgery:  Cataractive3    *Please bring all your eyedrops to the surgery center.    *No solid food after midnight prior to surgery.     *Clear liquids: coffee (no cream), tea, water, and jello are OK before 7:45 A.M.  You may take your regular pills with this.    *If you are taking glaucoma drops, continue as usual.    Jordana Vazquez MD  [phone]

## 2017-05-05 NOTE — PROGRESS NOTES
" Current Eye Medications: none     Subjective:  preop kpe od on 05/17/2017  No change in vision since last appointment. No eye pain. Has felt \"off balance\" since cataract developed in the right eye and feels that his depth perception is poor.      Objective:  See Ophthalmology Exam.       Assessment:  Ishmael Whitman is a 62 year old male who presents with:   Encounter Diagnosis   Name Primary?     Combined form of age-related cataract, both eyes Target mostly distance right eye.        Plan:  PRE-OP CATARACT INSTRUCTIONS    *Use the following drops in the right eye 4 times on the day before surgery and once the morning of surgery:  Cataractive3  *Please bring all your eyedrops to the surgery center.  *No solid food after midnight prior to surgery.   *Clear liquids: coffee (no cream), tea, water, and jello are OK before 7:45 A.M.  You may take your regular pills with this.  *If you are taking glaucoma drops, continue as usual.    Jordana Vazquez MD  [phone]    "

## 2017-05-05 NOTE — MR AVS SNAPSHOT
After Visit Summary   5/5/2017    Ishmael Whitman    MRN: [id number]           Patient Information     Date Of Birth          1955        Visit Information        Provider Department      5/5/2017 8:15 AM Jordana Vazquez MD [facility]        Today's Diagnoses     Combined form of age-related cataract, both eyes    -  1      Care Instructions    PRE-OP CATARACT INSTRUCTIONS    *Use the following drops in the right eye 4 times on the day before surgery and once the morning of surgery:  Cataractive3    *Please bring all your eyedrops to the surgery center.    *No solid food after midnight prior to surgery.     *Clear liquids: coffee (no cream), tea, water, and jello are OK before 7:45 A.M.  You may take your regular pills with this.    *If you are taking glaucoma drops, continue as usual.    Jordana Vazquez MD  [phone]        Follow-ups after your visit        Follow-up notes from your care team     Return for PO1, as scheduled.      Your next 10 appointments already scheduled     May 17, 2017   Procedure with Jordana Vazquez MD   [facility] (--)    [address] [place], [place] [address], [address]  [facility] [address]   [phone]            May 18, 2017  7:45 AM CDT   Return Visit with Jordana Vazquez MD   [facility] ([facility])    [address] [address] [address]   [phone]            May 25, 2017  7:45 AM CDT   Return Visit with MD [facility] ([facility])    [address] [address]   [phone]            Jun 28, 2017  8:15 AM CDT   Return Visit with Jordana Vazquez MD   [facility] ([facility])    [address] [facility] [place] [address]   [phone]              Who to contact     If you have questions or need follow up information about today's clinic visit or your schedule please contact [facility]  [facility] directly at [phone].  Normal or non-critical lab and imaging results will be communicated to you by MyChart, letter or phone within 4 business days after the clinic has received the results. If you do not hear from us within 7 days, please contact the clinic through [employer] or phone. If you have a critical or abnormal lab result, we will notify you by phone as soon as possible.  Submit refill requests through [employer] or call your pharmacy and they will forward the refill request to us. Please allow 3 business days for your refill to be completed.          Additional Information About Your Visit        [employer] Information     [employer] gives you secure access to your electronic health record. If you see a primary care provider, you can also send messages to your care team and make appointments. If you have questions, please call your primary care clinic.  If you do not have a primary care provider, please call [phone] and they will assist you.        Care EveryWhere ID     This is your Care EveryWhere ID. This could be used by other organizations to access your [place] medical records  [id number]         Blood Pressure from Last 3 Encounters:   05/04/17 130/80   06/09/16 131/82   12/17/15 157/84    Weight from Last 3 Encounters:   05/04/17 88.5 kg (195 lb)   06/09/16 94.3 kg (208 lb)   12/17/15 92.5 kg (204 lb)              We Performed the Following     IOL MASTER (1st eye)     IOL MASTER (both eyes)        Primary Care Provider Office Phone # Fax #    Giovanni Calixto [phone]       [facility] [address] [facility] [address]        Thank you!     Thank you for choosing [facility]  for your care. Our goal is always to provide you with excellent care. Hearing back from our patients is one way we can continue to improve our services. Please take a few minutes to complete the written survey that you may receive in the mail after your visit with  us. Thank you!             Your Updated Medication List - Protect others around you: Learn how to safely use, store and throw away your medicines at www.disposemymeds.org.          This list is accurate as of: 5/5/17  8:47 AM.  Always use your most recent med list.                   Brand Name Dispense Instructions for use    amLODIPine 5 MG tablet    NORVASC    90 tablet    Take 1 tablet (5 mg) by mouth daily for blood pressure take in AM       aspirin 81 MG tablet      Take 1 tablet by mouth daily.       atenolol 100 MG tablet    TENORMIN    90 tablet    Take 1 tablet (100 mg) by mouth daily At bedtime for blood pressure.       atorvastatin 40 MG tablet    LIPITOR    90 tablet    TAKE 1 TABLET BY MOUTH EVERY DAY FOR CHOLESTEROL       Bromfenac Sodium Powd     1 Bottle    1 Bottle 4 times daily 1 drop four times a day into the operative eye starting 1 day before surgery. Use until bottle runs out.       Dexamethasone Acetate Powd     1 Bottle    1 Bottle 4 times daily 1 drop four times a day into the operative eye starting 1 day before surgery. Use until bottle runs out.       ibuprofen 200 MG tablet    ADVIL/MOTRIN     200 mg. 4 tabs daily when needed       Moxifloxacin HCl Powd     1 Bottle    1 Bottle 4 times daily 1 drop four times a day into the operative eye starting 1 day before surgery. Use until bottle runs out.       omeprazole 20 MG CR capsule    priLOSEC    90 capsule    Take 1 capsule (20 mg) by mouth daily as needed To protect stomach       triamterene-hydrochlorothiazide 37.5-25 MG per capsule    DYAZIDE    135 capsule    TAKE ONE TABLET DAILY IN MORING (OK FOR ONE EXTRA PILL DAILY IF NEEDED FOR EDEMA).

## 2017-05-07 DIAGNOSIS — E78.5 HYPERLIPIDEMIA LDL GOAL <130: ICD-10-CM

## 2017-05-08 RX ORDER — ATORVASTATIN CALCIUM 40 MG/1
TABLET, FILM COATED ORAL
OUTPATIENT
Start: 2017-05-08

## 2017-05-16 NOTE — H&P (VIEW-ONLY)
[facility]  [address] [facility] [address]  [phone]  Dept: [phone]    PRE-OP EVALUATION:  Today's date: 2017    Ishmael Whitman (: 1955) presents for pre-operative evaluation assessment as requested by Dr. Vazquez.  He requires evaluation and anesthesia risk assessment prior to undergoing surgery/procedure for treatment of right eye .  Proposed procedure: Cataract    Date of Surgery/ Procedure: 2017  Time of Surgery/ Procedure: 1:30 pm  Hospital/Surgical Facility: [facility]    Primary Physician: Giovanni Calixto  Type of Anesthesia Anticipated: to be determined    Patient has a Health Care Directive or Living Will:  NO    1. NO - Do you have a history of heart attack, stroke, stent, bypass or surgery on an artery in the head, neck, heart or legs?  2. NO - Do you ever have any pain or discomfort in your chest?  3. NO - Do you have a history of  Heart Failure?  4. NO - Are you troubled by shortness of breath when: walking on the level, up a slight hill or at night?  5. NO - Do you currently have a cold, bronchitis or other respiratory infection?  6. NO - Do you have a cough, shortness of breath or wheezing?  7. NO - Do you sometimes get pains in the calves of your legs when you walk?  8. NO - Do you or anyone in your family have previous history of blood clots?  9. NO - Do you or does anyone in your family have a serious bleeding problem such as prolonged bleeding following surgeries or cuts?  10. NO - Have you ever had problems with anemia or been told to take iron pills?  11. NO - Have you had any abnormal blood loss such as black, tarry or bloody stools, or abnormal vaginal bleeding?  12. NO - Have you ever had a blood transfusion?  13. NO - Have you or any of your relatives ever had problems with anesthesia?  14. NO - Do you have sleep apnea, excessive snoring or daytime drowsiness?  15. NO - Do you have any prosthetic heart valves?  16. yes - Do  you have prosthetic joints? Right hip   17. NO - Is there any chance that you may be pregnant?      HPI:                                                      Brief HPI related to upcoming procedure: cataract  Blood pressure - no outside blood pressure. Active.     HYPERTENSION - Patient has longstanding history of mod-severe HTN , currently denies any symptoms referable to elevated blood pressure. Specifically denies chest pain, palpitations, dyspnea, orthopnea, PND or peripheral edema. Blood pressure readings have been in normal range. Current medication regimen is as listed below. Patient denies any side effects of medication.                                                                                                                                                                                          .  HYPERLIPIDEMIA - Patient has a long history of significant Hyperlipidemia requiring medication for treatment with recent good control. Patient reports no problems or side effects with the medication.                                                                                                                                                       .    MEDICAL HISTORY:                                                      Patient Active Problem List    Diagnosis Date Noted     Cataract, post subcapsular polar senile, right 03/01/2017     Priority: Medium     Essential hypertension with goal blood pressure less than 140/90 06/09/2016     Priority: Medium     Peripheral edema 08/25/2015     Priority: Medium     Chronic knee pain 08/07/2014     Priority: Medium     Manzanares's esophagus 08/27/2012     Priority: Medium     Microhematuria 08/27/2012     Priority: Medium     Elevated ferritin level 08/27/2012     Priority: Medium     Advanced directives, counseling/discussion 06/03/2011     Priority: Medium     Discussed Advance Directive planning with patient; information given to patient to review.            Anemia 06/03/2011     Priority: Medium     Gastritis 06/03/2011     Priority: Medium     BPH (benign prostatic hyperplasia) 06/03/2011     Priority: Medium     Tobacco abuse 06/03/2011     Priority: Medium     BMI 29.0-29.9,adult 06/03/2011     Priority: Medium     Hypertension goal BP (blood pressure) < 140/90 05/10/2011     Priority: Medium     HYPERLIPIDEMIA LDL GOAL <130 10/31/2010     Priority: Medium     Chronic arthritis 04/20/2009     Priority: Medium     (Problem list name updated by automated process. Provider to review and confirm.)       Olecranon bursitis 04/20/2009     Priority: Medium      Past Medical History:   Diagnosis Date     DJD (degenerative joint disease)     knees     HTN      Nonsenile cataract      Pure hypercholesterolemia      Past Surgical History:   Procedure Laterality Date     COLONOSCOPY  10/28/2011    Procedure:COLONOSCOPY; Colonoscopy, history of polyps, anemia; Surgeon:RIOS NOVA; Location:[facility] OR     ORTHOPEDIC SURGERY  10.20.14    right hip replacement     VASECTOMY       Current Outpatient Prescriptions   Medication Sig Dispense Refill     atorvastatin (LIPITOR) 40 MG tablet TAKE 1 TABLET BY MOUTH EVERY DAY FOR CHOLESTEROL 90 tablet 0     Bromfenac Sodium POWD 1 Bottle 4 times daily 1 drop four times a day into the operative eye starting 1 day before surgery. Use until bottle runs out. 1 Bottle 0     Dexamethasone Acetate POWD 1 Bottle 4 times daily 1 drop four times a day into the operative eye starting 1 day before surgery. Use until bottle runs out. 1 Bottle 0     Moxifloxacin HCl POWD 1 Bottle 4 times daily 1 drop four times a day into the operative eye starting 1 day before surgery. Use until bottle runs out. 1 Bottle 0     triamterene-hydrochlorothiazide (DYAZIDE) 37.5-25 MG per capsule TAKE ONE TABLET DAILY IN MORING (OK FOR ONE EXTRA PILL DAILY IF NEEDED FOR EDEMA). 135 capsule 0     omeprazole (PRILOSEC) 20 MG CR capsule Take 1 capsule (20 mg) by mouth daily as  needed To protect stomach 90 capsule 1     atenolol (TENORMIN) 100 MG tablet Take 1 tablet (100 mg) by mouth daily At bedtime for blood pressure. 90 tablet 1     amLODIPine (NORVASC) 5 MG tablet Take 1 tablet (5 mg) by mouth daily for blood pressure take in AM 90 tablet 1     buPROPion (WELLBUTRIN SR) 150 MG 12 hr tablet Take 1 tablet once daily and increase to 1 tablet twice daily after  7 days for smoking. 60 tablet 5     varenicline (CHANTIX STARTING MONTH PAK) 0.5 MG X 11 & 1 MG X 42 tablet Take 0.5 mg tab daily for 3 days, then 0.5 mg tab twice daily for 4 days, then 1 mg twice daily. For smoking 53 tablet 5     aspirin 81 MG tablet Take 1 tablet by mouth daily.       ibuprofen (ADVIL,MOTRIN) 200 MG tablet 200 mg. 4 tabs daily when needed       OTC products: None, except as noted above    No Known Allergies   Latex Allergy: NO    Social History   Substance Use Topics     Smoking status: Current Every Day Smoker     Packs/day: 1.00     Years: 30.00     Types: Cigarettes     Smokeless tobacco: Never Used     Alcohol use Yes     History   Drug Use No       REVIEW OF SYSTEMS:                                                    C: NEGATIVE for fever, chills, change in weight  INTEGUMENTARY/SKIN: NEGATIVE for worrisome rashes, moles or lesions  E/M: NEGATIVE for ear, mouth and throat problems  R: NEGATIVE for significant cough or SHORTNESS OF BREATH, smoking 1/2 ppd - cutting down - not interested med.  CV: NEGATIVE for chest pain, palpitations or peripheral edema, good exercise tolerance  GI: NEGATIVE for nausea, abdominal pain,+ heartburn stable, or change in bowel habits. Not taking vitaminD but balanced diet.   : negative for dysuria, hematuria, decreased urinary stream,  MUSCULOSKELETAL: NEGATIVE for significant arthralgias or myalgia  NEURO: NEGATIVE for weakness, dizziness or paresthesias  ENDOCRINE: NEGATIVE for temperature intolerance, skin/hair changes  HEME/ALLERGY/IMMUNE: NEGATIVE for bleeding  "problems  PSYCHIATRIC: NEGATIVE for changes in mood or affect    EXAM:                                                    There were no vitals taken for this visit.   /80  Pulse 68  Temp 98.4  F (36.9  C) (Oral)  Ht 5' 8\" (1.727 m)  Wt 195 lb (88.5 kg)  SpO2 97%  BMI 29.65 kg/m2    GENERAL APPEARANCE: healthy, alert and no distress  HENT: ear canals and TM's normal and nose and mouth without ulcers or lesions  NECK: no adenopathy, no asymmetry, masses, or scars and thyroid normal to palpation  RESP: lungs clear to auscultation - no rales, rhonchi or wheezes  CV: regular rate and rhythm, normal S1 S2, no S3 or S4 and no murmur, click or rub   ABDOMEN: soft, nontender, no HSM or masses and bowel sounds normal  MS: extremities normal- no gross deformities noted  NEURO: Normal strength and tone, sensory exam grossly normal, mentation intact and speech normal  PSYCH: mentation appears normal and affect normal/bright    DIAGNOSTICS:                                                      Labs Resulted Today:   Results for orders placed or performed in visit on 06/09/16   PSA, screen   Result Value Ref Range    PSA 0.46 0 - 4 ug/L   Renal panel (Alb, BUN, Ca, Cl, CO2, Creat, Gluc, Phos, K, Na)   Result Value Ref Range    Sodium 138 133 - 144 mmol/L    Potassium 4.1 3.4 - 5.3 mmol/L    Chloride 105 94 - 109 mmol/L    Carbon Dioxide 26 20 - 32 mmol/L    Anion Gap 7 3 - 14 mmol/L    Glucose 96 70 - 99 mg/dL    Urea Nitrogen 15 7 - 30 mg/dL    Creatinine 0.85 0.66 - 1.25 mg/dL    GFR Estimate >90  Non  GFR Calc   >60 mL/min/1.7m2    GFR Estimate If Black >90   GFR Calc   >60 mL/min/1.7m2    Calcium 8.4 (L) 8.5 - 10.1 mg/dL    Phosphorus 2.8 2.5 - 4.5 mg/dL    Albumin 3.4 3.4 - 5.0 g/dL     Labs Drawn and in Process:   Unresulted Labs Ordered in the Past 30 Days of this Admission     No orders found from 3/5/2017 to 5/5/2017.          Recent Labs   Lab Test  06/09/16   0916  08/18/15   " 0823 11/12/14 11/06/14 09/30/14   1021   HGB   --   14.0   --    --    --   13.5   PLT   --   213   --    --    --   178   INR   --    --   3.0*  2.6*   < >   --    NA  138  137   --    --    --    --    POTASSIUM  4.1  4.5   --    --    --   4.2   CR  0.85  0.94   --    --    --    --     < > = values in this interval not displayed.   HGB=12.4    IMPRESSION:                                                    Reason for surgery/procedure: cataract// repair  Diagnosis/reason for consult: htn/high cholesterol/cataract/gastritis// fitness for surgery  Ok for low risk surgery. Denies chest pain or shortness of breath. Blood pressure ok but still smoking.    ASSESSMENT / PLAN:  (I10) Essential hypertension with goal blood pressure less than 140/90  Comment: stable  Plan: Renal panel (Alb, BUN, Ca, Cl, CO2, Creat,         Gluc, Phos, K, Na)        Quit smoking. Continue exercise/diet. Chest pain or shortness of breath to er    (E78.5) Hyperlipidemia LDL goal <130  Plan: Lipid Profile (Chol, Trig, HDL, LDL calc)        Continue lipitor    (K29.50) Other chronic gastritis  Comment: hgb a little loww  Plan: CBC with platelets        Repeat egd. More iron in diet.. Continue prilosec. [id number] ROSAURA daily.     (Z12.11) Colon cancer screening  Comment: overdue. History polyps 2011  Plan: GASTROENTEROLOGY ADULT REF PROCEDURE ONLY        Back to [facility] this summer.    The proposed surgical procedure is considered LOW risk.    REVISED CARDIAC RISK INDEX  The patient has the following serious cardiovascular risks for perioperative complications such as (MI, PE, VFib and 3  AV Block):  No serious cardiac risks  INTERPRETATION: 1 risks: Class II (low risk - 0.9% complication rate)    The patient has the following additional risks for perioperative complications:  No identified additional risks      RECOMMENDATIONS:                                                        --Patient is to take all scheduled medications on the day of  surgery EXCEPT for modifications listed below.    APPROVAL GIVEN to proceed with proposed procedure, without further diagnostic evaluation       Signed Electronically by: Giovanni Calixto MD    Copy of this evaluation report is provided to requesting physician.    [facility] Preop Guidelines

## 2017-05-17 ENCOUNTER — ANESTHESIA EVENT (OUTPATIENT)
Dept: SURGERY | Facility: CLINIC | Age: 62
End: 2017-05-17
Payer: COMMERCIAL

## 2017-05-17 ENCOUNTER — ANESTHESIA (OUTPATIENT)
Dept: SURGERY | Facility: CLINIC | Age: 62
End: 2017-05-17
Payer: COMMERCIAL

## 2017-05-17 ENCOUNTER — HOSPITAL ENCOUNTER (OUTPATIENT)
Facility: CLINIC | Age: 62
Discharge: HOME | End: 2017-05-17
Attending: STUDENT IN AN ORGANIZED HEALTH CARE EDUCATION/TRAINING PROGRAM | Admitting: STUDENT IN AN ORGANIZED HEALTH CARE EDUCATION/TRAINING PROGRAM
Payer: COMMERCIAL

## 2017-05-17 VITALS
RESPIRATION RATE: 16 BRPM | TEMPERATURE: 98.3 F | SYSTOLIC BLOOD PRESSURE: 139 MMHG | OXYGEN SATURATION: 98 % | DIASTOLIC BLOOD PRESSURE: 75 MMHG

## 2017-05-17 PROCEDURE — 27210794 ZZH OR GENERAL SUPPLY STERILE: Performed by: STUDENT IN AN ORGANIZED HEALTH CARE EDUCATION/TRAINING PROGRAM

## 2017-05-17 PROCEDURE — 25000132 ZZH RX MED GY IP 250 OP 250 PS 637: Performed by: STUDENT IN AN ORGANIZED HEALTH CARE EDUCATION/TRAINING PROGRAM

## 2017-05-17 PROCEDURE — 37000009 ZZH ANESTHESIA TECHNICAL FEE, EACH ADDTL 15 MIN: Performed by: STUDENT IN AN ORGANIZED HEALTH CARE EDUCATION/TRAINING PROGRAM

## 2017-05-17 PROCEDURE — 25000128 H RX IP 250 OP 636: Performed by: ANESTHESIOLOGY

## 2017-05-17 PROCEDURE — 40000170 ZZH STATISTIC PRE-PROCEDURE ASSESSMENT II: Performed by: STUDENT IN AN ORGANIZED HEALTH CARE EDUCATION/TRAINING PROGRAM

## 2017-05-17 PROCEDURE — 25000128 H RX IP 250 OP 636

## 2017-05-17 PROCEDURE — 71000028 ZZH EYE RECOVERY PHASE 2 EACH 15 MINS: Performed by: STUDENT IN AN ORGANIZED HEALTH CARE EDUCATION/TRAINING PROGRAM

## 2017-05-17 PROCEDURE — 25000125 ZZHC RX 250: Performed by: STUDENT IN AN ORGANIZED HEALTH CARE EDUCATION/TRAINING PROGRAM

## 2017-05-17 PROCEDURE — V2632 POST CHMBR INTRAOCULAR LENS: HCPCS | Performed by: STUDENT IN AN ORGANIZED HEALTH CARE EDUCATION/TRAINING PROGRAM

## 2017-05-17 PROCEDURE — 36000101 ZZH EYE SURGERY LEVEL 3 1ST 30 MIN: Performed by: STUDENT IN AN ORGANIZED HEALTH CARE EDUCATION/TRAINING PROGRAM

## 2017-05-17 PROCEDURE — 37000008 ZZH ANESTHESIA TECHNICAL FEE, 1ST 30 MIN: Performed by: STUDENT IN AN ORGANIZED HEALTH CARE EDUCATION/TRAINING PROGRAM

## 2017-05-17 PROCEDURE — 66984 XCAPSL CTRC RMVL W/O ECP: CPT | Mod: RT | Performed by: STUDENT IN AN ORGANIZED HEALTH CARE EDUCATION/TRAINING PROGRAM

## 2017-05-17 PROCEDURE — 25000128 H RX IP 250 OP 636: Performed by: STUDENT IN AN ORGANIZED HEALTH CARE EDUCATION/TRAINING PROGRAM

## 2017-05-17 DEVICE — EYE IMP IOL AMO PCL TECNIS ZCB00 17.5: Type: IMPLANTABLE DEVICE | Site: EYE | Status: FUNCTIONAL

## 2017-05-17 RX ORDER — ONDANSETRON 2 MG/ML
INJECTION INTRAMUSCULAR; INTRAVENOUS PRN
Status: DISCONTINUED | OUTPATIENT
Start: 2017-05-17 | End: 2017-05-17

## 2017-05-17 RX ORDER — FLURBIPROFEN SODIUM 0.3 MG/ML
1 SOLUTION/ DROPS OPHTHALMIC
Status: COMPLETED | OUTPATIENT
Start: 2017-05-17 | End: 2017-05-17

## 2017-05-17 RX ORDER — BALANCED SALT SOLUTION 6.4; .75; .48; .3; 3.9; 1.7 MG/ML; MG/ML; MG/ML; MG/ML; MG/ML; MG/ML
SOLUTION OPHTHALMIC PRN
Status: DISCONTINUED | OUTPATIENT
Start: 2017-05-17 | End: 2017-05-17 | Stop reason: HOSPADM

## 2017-05-17 RX ORDER — PROPARACAINE HYDROCHLORIDE 5 MG/ML
1 SOLUTION/ DROPS OPHTHALMIC ONCE
Status: COMPLETED | OUTPATIENT
Start: 2017-05-17 | End: 2017-05-17

## 2017-05-17 RX ORDER — PHENYLEPHRINE HYDROCHLORIDE 25 MG/ML
1 SOLUTION/ DROPS OPHTHALMIC
Status: COMPLETED | OUTPATIENT
Start: 2017-05-17 | End: 2017-05-17

## 2017-05-17 RX ORDER — TROPICAMIDE 10 MG/ML
1 SOLUTION/ DROPS OPHTHALMIC
Status: COMPLETED | OUTPATIENT
Start: 2017-05-17 | End: 2017-05-17

## 2017-05-17 RX ORDER — SODIUM CHLORIDE, SODIUM LACTATE, POTASSIUM CHLORIDE, CALCIUM CHLORIDE 600; 310; 30; 20 MG/100ML; MG/100ML; MG/100ML; MG/100ML
500 INJECTION, SOLUTION INTRAVENOUS CONTINUOUS
Status: DISCONTINUED | OUTPATIENT
Start: 2017-05-17 | End: 2017-05-17 | Stop reason: HOSPADM

## 2017-05-17 RX ORDER — CYCLOPENTOLATE HYDROCHLORIDE 10 MG/ML
1 SOLUTION/ DROPS OPHTHALMIC
Status: COMPLETED | OUTPATIENT
Start: 2017-05-17 | End: 2017-05-17

## 2017-05-17 RX ORDER — TETRACAINE HYDROCHLORIDE 5 MG/ML
SOLUTION OPHTHALMIC PRN
Status: DISCONTINUED | OUTPATIENT
Start: 2017-05-17 | End: 2017-05-17 | Stop reason: HOSPADM

## 2017-05-17 RX ORDER — LIDOCAINE HYDROCHLORIDE 10 MG/ML
INJECTION, SOLUTION EPIDURAL; INFILTRATION; INTRACAUDAL; PERINEURAL PRN
Status: DISCONTINUED | OUTPATIENT
Start: 2017-05-17 | End: 2017-05-17 | Stop reason: HOSPADM

## 2017-05-17 RX ADMIN — TROPICAMIDE 1 DROP: 10 SOLUTION/ DROPS OPHTHALMIC at 12:26

## 2017-05-17 RX ADMIN — CYCLOPENTOLATE HYDROCHLORIDE 1 DROP: 10 SOLUTION/ DROPS OPHTHALMIC at 12:29

## 2017-05-17 RX ADMIN — FLURBIPROFEN SODIUM 1 DROP: 0.3 SOLUTION/ DROPS OPHTHALMIC at 12:49

## 2017-05-17 RX ADMIN — CYCLOPENTOLATE HYDROCHLORIDE 1 DROP: 10 SOLUTION/ DROPS OPHTHALMIC at 12:26

## 2017-05-17 RX ADMIN — CYCLOPENTOLATE HYDROCHLORIDE 1 DROP: 10 SOLUTION/ DROPS OPHTHALMIC at 12:34

## 2017-05-17 RX ADMIN — PROPARACAINE HYDROCHLORIDE 1 DROP: 5 SOLUTION/ DROPS OPHTHALMIC at 12:26

## 2017-05-17 RX ADMIN — MIDAZOLAM HYDROCHLORIDE 2 MG: 1 INJECTION, SOLUTION INTRAMUSCULAR; INTRAVENOUS at 13:19

## 2017-05-17 RX ADMIN — PHENYLEPHRINE HYDROCHLORIDE 1 DROP: 2.5 SOLUTION/ DROPS OPHTHALMIC at 12:27

## 2017-05-17 RX ADMIN — PHENYLEPHRINE HYDROCHLORIDE 1 DROP: 2.5 SOLUTION/ DROPS OPHTHALMIC at 12:34

## 2017-05-17 RX ADMIN — TROPICAMIDE 1 DROP: 10 SOLUTION/ DROPS OPHTHALMIC at 12:42

## 2017-05-17 RX ADMIN — SODIUM CHLORIDE, POTASSIUM CHLORIDE, SODIUM LACTATE AND CALCIUM CHLORIDE 500 ML: 600; 310; 30; 20 INJECTION, SOLUTION INTRAVENOUS at 12:39

## 2017-05-17 RX ADMIN — PHENYLEPHRINE HYDROCHLORIDE 1 DROP: 2.5 SOLUTION/ DROPS OPHTHALMIC at 12:42

## 2017-05-17 RX ADMIN — FLURBIPROFEN SODIUM 1 DROP: 0.3 SOLUTION/ DROPS OPHTHALMIC at 12:38

## 2017-05-17 RX ADMIN — TROPICAMIDE 1 DROP: 10 SOLUTION/ DROPS OPHTHALMIC at 12:36

## 2017-05-17 RX ADMIN — ONDANSETRON 4 MG: 2 INJECTION INTRAMUSCULAR; INTRAVENOUS at 13:24

## 2017-05-17 RX ADMIN — FLURBIPROFEN SODIUM 1 DROP: 0.3 SOLUTION/ DROPS OPHTHALMIC at 12:42

## 2017-05-17 RX ADMIN — FLURBIPROFEN SODIUM 1 DROP: 0.3 SOLUTION/ DROPS OPHTHALMIC at 12:53

## 2017-05-17 ASSESSMENT — LIFESTYLE VARIABLES: TOBACCO_USE: 1

## 2017-05-17 NOTE — OP NOTE
PreOp Diagnosis: Visually significant nuclear sclerotic cataract right eye  PostOp Diagnosis: Same  Surgeon: Jordana Vazquez MD  Implant: Technis [id number] 17.5D    Procedures:   1. Review of intraocular lens calculations, both eyes   2. Phacoemulsification and extraction of lens  right eye   3. Intraocular lens implantation right eye  Anesthesia: MAC/topical  Complications: None  EBL: <1cc    Ishmael Whitman suffers from a visually significant cataract of the right eye. This has caused problems with distance and reading vision, including glare. After discussing the risks, benefits, and alternatives, the patient wishes to proceed with cataract surgery.    The patient was identified in the pre-op area where the right eye was marked. The patient was then brought to the operating room where a time out was called, identifying the patient, the procedure, and the correct site. Tetracaine drops were applied to both eyes. The operative eye was then prepped and draped in the usual sterile ophthalmic fashion. An eyelid speculum was placed into the operative eye. Additional tetracaine drops were applied. A paracentesis was made superior with a supersharp blade. . Due to poor  red reflex, trypan blue was irrigated into the anterior chamber to enhance capsular visualization.  This was irrigated from the anterior chamber after 30 seconds with 1% preservative-free lidocaine.   Viscoat was injected to deepen the anterior chamber. A 2.4mm clear corneal wound was created with a keratome blade temporally.  A continuous curvilinear capsulorrhexis was started with a bent cystitome and completed with Utrada forceps. Hydrodissection of the lens nucleus was performed with BSS on a cannula. The lens nucleus was rotated. Phacoemulsification of the lens nucleus was accomplished in a phacoemulsification stop and chop technique. Remaining cortex was removed with irrigation and aspiration. The lens capsule was noted to be intact. Provisc was used  to inflate the capsular bag.  The lens was injected into the capsular bag. Remaining viscoelastic was removed with irrigation and aspiration. The wounds were checked and found to be watertight after hydration. The eyelid speculum was removed and CatarActive3 drops were placed into the operative eye. The patient tolerated the procedure well and was in stable condition on the way to the recovery area.     Jordana Vazquez MD

## 2017-05-17 NOTE — IP AVS SNAPSHOT
MRN:[id number]                      After Visit Summary   5/17/2017    Ishmael Whitman    MRN: [id number]           Thank you!     Thank you for choosing [facility] for your care. Our goal is always to provide you with excellent care. Hearing back from our patients is one way we can continue to improve our services. Please take a few minutes to complete the written survey that you may receive in the mail after you visit with us. Thank you!        Patient Information     Date Of Birth          1955        About your hospital stay     You were admitted on:  May 17, 2017 You last received care in the:  [facility]    You were discharged on:  May 17, 2017       Who to Call     For medical emergencies, please call 911.  For non-urgent questions about your medical care, please call your primary care provider or clinic, [phone]  For questions related to your surgery, please call your surgery clinic        Attending Provider     Provider Specialty    Jordana Vazquez MD Ophthalmology       Primary Care Provider Office Phone # Fax #    Giovanni Deshaun Calixto [phone]       [facility] [address] [facility] [address]        Your next 10 appointments already scheduled     May 18, 2017  7:45 AM CDT   [facility] Jordana [facility]quez [facility]    [address] [address] [phone]            May 25, 2017  7:45 AM CDT   Return Visit with Jordana Vazquez MD   [facility] ([facility])    [address] [address]   [phone]            Jun 28, 2017  8:15 AM CDT   [facility]    [address] [address] [phone]              Further instructions from your care team       CATARACT SURGERY POST-OP INSTRUCTIONS  Dr. Silveira  [place]  [phone]      *Continue using CatarActive3 four times a day in the operative eye.  *You should get 3 doses in today and 4 doses daily starting tomorrow.      Keep the eye shield taped in place unless putting drops in. We will remove it for you in the office tomorrow.      Light sensitivity may be noticed. Sunglasses may be worn for comfort.      Do not rub the operated eye.      Keep the operated eye dry. You may wash your hair, bathe or shower, but keep the operated eye closed while doing so.       No swimming, hot tub, or sauna for 2 weeks.      No make up around eye for 5 days.      No bending at the waist or lifting more than 10 pounds for one week.      May take Tylenol (per directions on bottle) for mild pain.      Call Dr. Vazquez's cell at [phone]  if any of the following should occur:    o Any sudden vision changes  o Nausea or severe headache  o Increase in pain not controlled  o Or signs of infection (pus, increasing redness or tenderness)      [facility] Discharge  Instructions  Anesthesia (Eye Care Center)   Adult Discharge Instructions    For 24 hours after surgery    1. Get plenty of rest.  Make arrangements to have a responsible adult stay with you for at least 6 hours after you leave the hospital.  2. Do not drive or use heavy equipment for 24 hours.    3. Do not drink alcohol for 24 hours.  4. Do not sign legal documents or make important decisions for 24 hours.  5. Avoid strenuous or risky activities. You may feel lightheaded.  If so, sit for a few minutes before standing.  Have someone help you get up.   6. Conscious sedation patients may resume a regular diet..  7. Any questions of medical nature, call your physician.    Pending Results     No orders found from 5/15/2017 to 5/18/2017.            Admission Information     Date & Time Provider Department Dept. Phone    5/17/2017 Jordana Vazquez MD [facility] [place] [phone]       Your Vitals Were     Blood Pressure Temperature Respirations Pulse Oximetry          119/75 98.3  F (36.8  C) (Temporal) 16 99%        [facility] Information     Embedded Chat gives you secure access to your electronic health record. If you see a primary care provider, you can also send messages to your care team and make appointments. If you have questions, please call your primary care clinic.  If you do not have a primary care provider, please call [phone] and they will assist you.        Care EveryWhere ID     This is your Care EveryWhere ID. This could be used by other organizations to access your [facility] medical records  [id number]           Review of your medicines      CONTINUE these medicines which have NOT CHANGED        Dose / Directions    amLODIPine 5 MG tablet   Commonly known as:  NORVASC   Used for:  Hypertension goal BP (blood pressure) < 140/90        Dose:  5 mg   Take 1 tablet (5 mg) by mouth daily for blood pressure take in AM   Quantity:  90 tablet   Refills:  1       aspirin 81 MG tablet        Dose:  1 tablet   Take 1 tablet by mouth daily.   Refills:  0       atenolol 100 MG tablet   Commonly known as:  TENORMIN   Used for:  Hypertension goal BP (blood pressure) < 140/90        Dose:  100 mg   Take 1 tablet (100 mg) by mouth daily At bedtime for blood pressure.   Quantity:  90 tablet   Refills:  1       atorvastatin 40 MG tablet   Commonly known as:  LIPITOR   Used for:  Hyperlipidemia LDL goal <130        TAKE 1 TABLET BY MOUTH EVERY DAY FOR CHOLESTEROL   Quantity:  90 tablet   Refills:  0       Bromfenac Sodium Powd   Used for:  Combined form of age-related cataract, both eyes        Dose:  1 Bottle   1 Bottle 4 times daily 1 drop four times a day into the operative eye starting 1 day before surgery. Use until bottle runs out.   Quantity:  1 Bottle   Refills:  0       Dexamethasone Acetate Powd   Used for:  Combined form of age-related cataract, both eyes        Dose:  1 Bottle   1 Bottle  4 times daily 1 drop four times a day into the operative eye starting 1 day before surgery. Use until bottle runs out.   Quantity:  1 Bottle   Refills:  0       ibuprofen 200 MG tablet   Commonly known as:  ADVIL/MOTRIN        Dose:  200 mg   200 mg. 4 tabs daily when needed   Refills:  0       Moxifloxacin HCl Powd   Used for:  Combined form of age-related cataract, both eyes        Dose:  1 Bottle   1 Bottle 4 times daily 1 drop four times a day into the operative eye starting 1 day before surgery. Use until bottle runs out.   Quantity:  1 Bottle   Refills:  0       omeprazole 20 MG CR capsule   Commonly known as:  priLOSEC   Used for:  Gastritis        Dose:  20 mg   Take 1 capsule (20 mg) by mouth daily as needed To protect stomach   Quantity:  90 capsule   Refills:  1       triamterene-hydrochlorothiazide 37.5-25 MG per capsule   Commonly known as:  DYAZIDE   Used for:  Essential hypertension with goal blood pressure less than 140/90        [facility] (OK FOR ONE EXTRA PILL DAILY IF NEEDED FOR EDEMA).   Quantity:  135 capsule   Refills:  0                Protect others around you: Learn how to safely use, store and throw away your medicines at www.disposemymeds.org.             Medication List: This is a list of all your medications and when to take them. Check marks below indicate your daily home schedule. Keep this list as a reference.      Medications           Morning Afternoon Evening Bedtime As Needed    amLODIPine 5 MG tablet   Commonly known as:  NORVASC   Take 1 tablet (5 mg) by mouth daily for blood pressure take in AM                                aspirin 81 MG tablet   Take 1 tablet by mouth daily.                                atenolol 100 MG tablet   Commonly known as:  TENORMIN   Take 1 tablet (100 mg) by mouth daily At bedtime for blood pressure.                                atorvastatin 40 MG tablet   Commonly known as:  LIPITOR   TAKE 1 TABLET BY MOUTH EVERY DAY FOR  CHOLESTEROL                                Bromfenac Sodium Powd   1 Bottle 4 times daily 1 drop four times a day into the operative eye starting 1 day before surgery. Use until bottle runs out.                                Dexamethasone Acetate Powd   1 Bottle 4 times daily 1 drop four times a day into the operative eye starting 1 day before surgery. Use until bottle runs out.                                ibuprofen 200 MG tablet   Commonly known as:  ADVIL/MOTRIN   200 mg. 4 tabs daily when needed                                Moxifloxacin HCl Powd   1 Bottle 4 times daily 1 drop four times a day into the operative eye starting 1 day before surgery. Use until bottle runs out.                                omeprazole 20 MG CR capsule   Commonly known as:  priLOSEC   Take 1 capsule (20 mg) by mouth daily as needed To protect stomach                                triamterene-hydrochlorothiazide 37.5-25 MG per capsule   Commonly known as:  DYAZIDE   TAKE ONE TABLET DAILY IN MORING (OK FOR ONE EXTRA PILL DAILY IF NEEDED FOR EDEMA).

## 2017-05-17 NOTE — ANESTHESIA PREPROCEDURE EVALUATION
Anesthesia Evaluation     . Pt has had prior anesthetic.     No history of anesthetic complications          ROS/MED HX    ENT/Pulmonary:     (+)tobacco use, Current use , . .   (-) sleep apnea   Neurologic:       Cardiovascular:     (+) Dyslipidemia, hypertension----. : . . . :. .       METS/Exercise Tolerance:  4 - Raking leaves, gardening   Hematologic:     (+) Anemia, -      Musculoskeletal:         GI/Hepatic:     (+) GERD Asymptomatic on medication,       Renal/Genitourinary:         Endo:         Psychiatric:         Infectious Disease:         Malignancy:         Other:                     Physical Exam  Normal systems: dental    Airway   Mallampati: III  TM distance: >3 FB  Neck ROM: full    Dental     Cardiovascular   Rhythm and rate: regular and normal      Pulmonary    breath sounds clear to auscultation                    Anesthesia Plan      History & Physical Review  History and physical reviewed and following examination; no interval change.    ASA Status:  2 .    NPO Status:  > 8 hours    Plan for MAC with Intravenous induction. Reason for MAC:  Procedure to face, neck, head or breast         Postoperative Care      Consents  Anesthetic plan, risks, benefits and alternatives discussed with:  Patient..                          .

## 2017-05-17 NOTE — DISCHARGE INSTRUCTIONS
CATARACT SURGERY POST-OP INSTRUCTIONS  Dr. Jordana Vazquez  [phone]      *Continue using CatarActive3 four times a day in the operative eye.  *You should get 3 doses in today and 4 doses daily starting tomorrow.      Keep the eye shield taped in place unless putting drops in. We will remove it for you in the office tomorrow.      Light sensitivity may be noticed. Sunglasses may be worn for comfort.      Do not rub the operated eye.      Keep the operated eye dry. You may wash your hair, bathe or shower, but keep the operated eye closed while doing so.       No swimming, hot tub, or sauna for 2 weeks.      No make up around eye for 5 days.      No bending at the waist or lifting more than 10 pounds for one week.      May take Tylenol (per directions on bottle) for mild pain.      Call Dr. Vazquez's cell at [phone]  if any of the following should occur:    o Any sudden vision changes  o Nausea or severe headache  o Increase in pain not controlled  o Or signs of infection (pus, increasing redness or tenderness)      [facility]est[facility] Discharge  Instructions  Anesthesia (Eye Care Center)   Adult Discharge Instructions    For 24 hours after surgery    1. Get plenty of rest.  Make arrangements to have a responsible adult stay with you for at least 6 hours after you leave the hospital.  2. Do not drive or use heavy equipment for 24 hours.    3. Do not drink alcohol for 24 hours.  4. Do not sign legal documents or make important decisions for 24 hours.  5. Avoid strenuous or risky activities. You may feel lightheaded.  If so, sit for a few minutes before standing.  Have someone help you get up.   6. Conscious sedation patients may resume a regular diet..  7. Any questions of medical nature, call your physician.

## 2017-05-17 NOTE — ANESTHESIA POSTPROCEDURE EVALUATION
Patient: Ishmael Whitman    Procedure(s):  RIGHT PHACOEMULSIFICATION CLEAR CORNEA WITH STANDARD INTRAOCULAR LENS IMPLANT  - Wound Class: I-Clean    Diagnosis:CATARACT  Diagnosis Additional Information: No value filed.    Anesthesia Type:  MAC    Note:  Anesthesia Post Evaluation    Patient location during evaluation: PACU  Patient participation: Able to fully participate in evaluation  Level of consciousness: awake and awake and alert  Pain management: adequate  Airway patency: patent  Cardiovascular status: acceptable  Respiratory status: acceptable  Hydration status: acceptable  PONV: none     Anesthetic complications: None          Last vitals:  Vitals:    05/17/17 1224 05/17/17 1354 05/17/17 1400   BP: 132/81 119/75 139/75   Resp: 16 16 16   Temp: 36.8  C (98.3  F)     SpO2: 98% 99% 98%         Electronically Signed By: Sharita Mccrary  May 17, 2017  3:09 PM

## 2017-05-17 NOTE — ANESTHESIA CARE TRANSFER NOTE
Patient: Ishmael Whitman    Procedure(s):  RIGHT PHACOEMULSIFICATION CLEAR CORNEA WITH STANDARD INTRAOCULAR LENS IMPLANT  - Wound Class: I-Clean    Diagnosis: CATARACT  Diagnosis Additional Information: No value filed.    Anesthesia Type:   MAC     Note:  Airway :Room Air  Patient transferred to:PACU (Eye Center)  Comments: VSS, spontaneously breathing with sats %.  To PACU, monitors on, report to RN.      Vitals: (Last set prior to Anesthesia Care Transfer)    CRNA VITALS  5/17/2017 1318 - 5/17/2017 1351      5/17/2017             Pulse: 58    SpO2: 99 %    Resp Rate (set): 10                Electronically Signed By: YURI Noble CRNA  May 17, 2017  1:51 PM

## 2017-05-17 NOTE — IP AVS SNAPSHOT
[facility]    [address]    [place] [address]    Phone:  [phone]    Fax:  [phone]                                       After Visit Summary   5/17/2017    Ishmael Whitman    MRN: [id number]           After Visit Summary Signature Page     I have received my discharge instructions, and my questions have been answered. I have discussed any challenges I see with this plan with the nurse or doctor.    ..........................................................................................................................................  Patient/Patient Representative Signature      ..........................................................................................................................................  Patient Representative Print Name and Relationship to Patient    ..................................................               ................................................  Date                                            Time    ..........................................................................................................................................  Reviewed by Signature/Title    ...................................................              ..............................................  Date                                                            Time

## 2017-05-18 ENCOUNTER — OFFICE VISIT (OUTPATIENT)
Dept: OPHTHALMOLOGY | Facility: CLINIC | Age: 62
End: 2017-05-18
Payer: COMMERCIAL

## 2017-05-18 DIAGNOSIS — Z96.1 PSEUDOPHAKIA OF RIGHT EYE: Primary | ICD-10-CM

## 2017-05-18 DIAGNOSIS — H25.812 COMBINED FORM OF AGE-RELATED CATARACT, LEFT EYE: ICD-10-CM

## 2017-05-18 PROCEDURE — 99024 POSTOP FOLLOW-UP VISIT: CPT | Performed by: STUDENT IN AN ORGANIZED HEALTH CARE EDUCATION/TRAINING PROGRAM

## 2017-05-18 ASSESSMENT — VISUAL ACUITY
OD_SC: 20/30
METHOD: SNELLEN - LINEAR
OD_SC+: -1

## 2017-05-18 ASSESSMENT — EXTERNAL EXAM - RIGHT EYE: OD_EXAM: NORMAL

## 2017-05-18 ASSESSMENT — SLIT LAMP EXAM - LIDS: COMMENTS: NORMAL

## 2017-05-18 ASSESSMENT — TONOMETRY: OS_IOP_MMHG: 19

## 2017-05-18 NOTE — PATIENT INSTRUCTIONS
POST-OP CATARACT INSTRUCTIONS    *   Use the following drop(s) in the RIGHT EYEfour times a day:        CatarActive 3    *   If you are taking glaucoma drops, continue as usual.    *   Wear eye shield when sleeping for one week.    *   No eye rubbing or lifting more than 10 pounds for one week.    *   Keep water out of eye for two weeks.    *   OK to resume aspirin and/or other blood thinners.    *   Return as scheduled in about one week.    *   If your vision worsens, eye becomes increasingly red, or becomes painful, call [phone].     Jordana Vazquez M.D.

## 2017-05-18 NOTE — PROGRESS NOTES
Current Eye Medications:  Cataractive3 qid od     Subjective:  PO1 Kelman Phacoemulsification Intraocular lens right eye.  Doing well.  No pain or discomfort.     Objective:  See Ophthalmology Exam.      Assessment:  Ishmael Whitman is a 62 year old male who presents with:     Pseudophakia of right eye Po1, doing well.      Combined form of age-related cataract, left eye          POST-OP CATARACT INSTRUCTIONS  *   Use the following drop(s) in the RIGHT EYEfour times a day:        CatarActive 3  *   If you are taking glaucoma drops, continue as usual.  *   Wear eye shield when sleeping for one week.  *   No eye rubbing or lifting more than 10 pounds for one week.  *   Keep water out of eye for two weeks.  *   OK to resume aspirin and/or other blood thinners.  *   Return as scheduled in about one week.  *   If your vision worsens, eye becomes increasingly red, or becomes painful, call [phone].     Jordana Vazquez M.D.

## 2017-05-18 NOTE — MR AVS SNAPSHOT
After Visit Summary   5/18/2017    Ishmael Whitman    MRN: [id number]           Patient Information     Date Of Birth          1955        Visit Information        Provider Department      5/18/2017 7:45 AM Jordana Vazquez MD [facility] [place]        Today's Diagnoses     Pseudophakia of right eye    -  1    Combined form of age-related cataract, left eye          Care Instructions    POST-OP CATARACT INSTRUCTIONS    *   Use the following drop(s) in the RIGHT EYEfour times a day:        CatarActive 3    *   If you are taking glaucoma drops, continue as usual.    *   Wear eye shield when sleeping for one week.    *   No eye rubbing or lifting more than 10 pounds for one week.    *   Keep water out of eye for two weeks.    *   OK to resume aspirin and/or other blood thinners.    *   Return as scheduled in about one week.    *   If your vision worsens, eye becomes increasingly red, or becomes painful, call [phone].     Jordana Vazquez M.D.        Follow-ups after your visit        Your next 10 appointments already scheduled     May 25, 2017  7:45 AM CDT   [facility]   [facility] ([address] [id number] [phone]            Jun 28, 2017  8:15 AM CDT   [facility] Visit [facility]   [facility] ([address] [address] [phone]              Who to contact     If you have questions or need follow up information about today's clinic visit or your schedule please contact [facility] directly at [phone].  Normal or non-critical lab and imaging results will be communicated to you by MyChart, letter or phone within 4 business days after the clinic has received the results. If you do not hear from us within 7 days, please contact the clinic through MyChart or phone. If you have a critical or  abnormal lab result, we will notify you by phone as soon as possible.  Submit refill requests through [email or web address] or call your pharmacy and they will forward the refill request to us. Please allow 3 business days for your refill to be completed.          Additional Information About Your Visit        Lemoptixhart Information     [email or web address] gives you secure access to your electronic health record. If you see a primary care provider, you can also send messages to your care team and make appointments. If you have questions, please call your primary care clinic.  If you do not have a primary care provider, please call [phone] and they will assist you.        Care EveryWhere ID     This is your Care EveryWhere ID. This could be used by other organizations to access your [place] medical records  [id number]         Blood Pressure from Last 3 Encounters:   05/17/17 139/75   05/04/17 130/80   06/09/16 131/82    Weight from Last 3 Encounters:   05/04/17 88.5 kg (195 lb)   06/09/16 94.3 kg (208 lb)   12/17/15 92.5 kg (204 lb)              Today, you had the following     No orders found for display       Primary Care Provider Office Phone # Fax #    Giovanni Calixto [phone]       [facility] [address] [facility] [address]        Thank you!     Thank you for choosing [facility] [place]  for your care. Our goal is always to provide you with excellent care. Hearing back from our patients is one way we can continue to improve our services. Please take a few minutes to complete the written survey that you may receive in the mail after your visit with us. Thank you!             Your Updated Medication List - Protect others around you: Learn how to safely use, store and throw away your medicines at www.disposemymeds.org.          This list is accurate as of: 5/18/17  7:58 AM.  Always use your most recent med list.                   Brand Name Dispense Instructions for use    amLODIPine 5 MG  tablet    NORVASC    90 tablet    Take 1 tablet (5 mg) by mouth daily for blood pressure take in AM       aspirin 81 MG tablet      Take 1 tablet by mouth daily.       atenolol 100 MG tablet    TENORMIN    90 tablet    Take 1 tablet (100 mg) by mouth daily At bedtime for blood pressure.       atorvastatin 40 MG tablet    LIPITOR    90 tablet    TAKE 1 TABLET BY MOUTH EVERY DAY FOR CHOLESTEROL       Bromfenac Sodium Powd     1 Bottle    1 Bottle 4 times daily 1 drop four times a day into the operative eye starting 1 day before surgery. Use until bottle runs out.       Dexamethasone Acetate Powd     1 Bottle    1 Bottle 4 times daily 1 drop four times a day into the operative eye starting 1 day before surgery. Use until bottle runs out.       ibuprofen 200 MG tablet    ADVIL/MOTRIN     200 mg. 4 tabs daily when needed       Moxifloxacin HCl Powd     1 Bottle    1 Bottle 4 times daily 1 drop four times a day into the operative eye starting 1 day before surgery. Use until bottle runs out.       omeprazole 20 MG CR capsule    priLOSEC    90 capsule    Take 1 capsule (20 mg) by mouth daily as needed To protect stomach       triamterene-hydrochlorothiazide 37.5-25 MG per capsule    DYAZIDE    135 capsule    TAKE ONE TABLET DAILY IN MORING (OK FOR ONE EXTRA PILL DAILY IF NEEDED FOR EDEMA).

## 2017-05-25 ENCOUNTER — OFFICE VISIT (OUTPATIENT)
Dept: OPHTHALMOLOGY | Facility: CLINIC | Age: 62
End: 2017-05-25
Payer: COMMERCIAL

## 2017-05-25 DIAGNOSIS — H25.812 COMBINED FORM OF AGE-RELATED CATARACT, LEFT EYE: ICD-10-CM

## 2017-05-25 DIAGNOSIS — Z96.1 PSEUDOPHAKIA OF RIGHT EYE: Primary | ICD-10-CM

## 2017-05-25 PROCEDURE — 99024 POSTOP FOLLOW-UP VISIT: CPT | Performed by: STUDENT IN AN ORGANIZED HEALTH CARE EDUCATION/TRAINING PROGRAM

## 2017-05-25 ASSESSMENT — REFRACTION_MANIFEST
OD_SPHERE: PLANO
OD_CYLINDER: SPHERE

## 2017-05-25 ASSESSMENT — VISUAL ACUITY
METHOD: SNELLEN - LINEAR
OD_SC: 20/20

## 2017-05-25 ASSESSMENT — SLIT LAMP EXAM - LIDS: COMMENTS: NORMAL

## 2017-05-25 ASSESSMENT — EXTERNAL EXAM - RIGHT EYE: OD_EXAM: NORMAL

## 2017-05-25 ASSESSMENT — TONOMETRY
OD_IOP_MMHG: 14
IOP_METHOD: APPLANATION

## 2017-05-25 NOTE — MR AVS SNAPSHOT
After Visit Summary   5/25/2017    Ishmael Whitman    MRN: [id number]           Patient Information     Date Of Birth          1955        Visit Information        Provider Department      5/25/2017 7:45 AM [facility]        Today's Diagnoses     Pseudophakia of right eye    -  1    Combined form of age-related cataract, left eye          Care Instructions    *   For the right eye:     Continue CatarActive3 four times a day until the bottle runs out.    *   Stop wearing eye shield.    *   No eye rubbing. May resume heavy lifting.    *   If you are taking glaucoma drops, continue as usual.    *   Return one month for final exam with glasses check.    *   If your vision worsens, eye becomes increasingly red, or becomes painful, call [phone].    Jordana Vazquez M.D.          Follow-ups after your visit        Follow-up notes from your care team     Return for as scheduled, Final MR.      Your next 10 appointments already scheduled     Jun 28, 2017  8:15 AM CDT   Return Visit [facility]   [facility] ([address] [address] [phone]              Who to contact     If you have questions or need follow up information about today's clinic visit or your schedule please contact [facility] directly at [phone].  Normal or non-critical lab and imaging results will be communicated to you by MyChart, letter or phone within 4 business days after the clinic has received the results. If you do not hear from us within 7 days, please contact the clinic through MyChart or phone. If you have a critical or abnormal lab result, we will notify you by phone as soon as possible.  Submit refill requests through [employer] or call your pharmacy and they will forward the refill request to us. Please allow 3 business days for your refill to be completed.          Additional  Information About Your Visit        JotSpothart Information     [employer] gives you secure access to your electronic health record. If you see a primary care provider, you can also send messages to your care team and make appointments. If you have questions, please call your primary care clinic.  If you do not have a primary care provider, please call [phone] and they will assist you.        Care EveryWhere ID     This is your Care EveryWhere ID. This could be used by other organizations to access your [place] medical records  [id number]         Blood Pressure from Last 3 Encounters:   05/17/17 139/75   05/04/17 130/80   06/09/16 131/82    Weight from Last 3 Encounters:   05/04/17 88.5 kg (195 lb)   06/09/16 94.3 kg (208 lb)   12/17/15 92.5 kg (204 lb)              Today, you had the following     No orders found for display       Primary Care Provider Office Phone # Fax #    Giovanni Calixto [phone]       [facility] [address] [facility] [address]        Thank you!     Thank you for choosing [facility] [place]  for your care. Our goal is always to provide you with excellent care. Hearing back from our patients is one way we can continue to improve our services. Please take a few minutes to complete the written survey that you may receive in the mail after your visit with us. Thank you!             Your Updated Medication List - Protect others around you: Learn how to safely use, store and throw away your medicines at www.disposemymeds.org.          This list is accurate as of: 5/25/17  8:02 AM.  Always use your most recent med list.                   Brand Name Dispense Instructions for use    amLODIPine 5 MG tablet    NORVASC    90 tablet    Take 1 tablet (5 mg) by mouth daily for blood pressure take in AM       aspirin 81 MG tablet      Take 1 tablet by mouth daily.       atenolol 100 MG tablet    TENORMIN    90 tablet    Take 1 tablet (100 mg) by mouth daily At  bedtime for blood pressure.       atorvastatin 40 MG tablet    LIPITOR    90 tablet    TAKE 1 TABLET BY MOUTH EVERY DAY FOR CHOLESTEROL       Bromfenac Sodium Powd     1 Bottle    1 Bottle 4 times daily 1 drop four times a day into the operative eye starting 1 day before surgery. Use until bottle runs out.       Dexamethasone Acetate Powd     1 Bottle    1 Bottle 4 times daily 1 drop four times a day into the operative eye starting 1 day before surgery. Use until bottle runs out.       ibuprofen 200 MG tablet    ADVIL/MOTRIN     200 mg. 4 tabs daily when needed       Moxifloxacin HCl Powd     1 Bottle    1 Bottle 4 times daily 1 drop four times a day into the operative eye starting 1 day before surgery. Use until bottle runs out.       omeprazole 20 MG CR capsule    priLOSEC    90 capsule    Take 1 capsule (20 mg) by mouth daily as needed To protect stomach       triamterene-hydrochlorothiazide 37.5-25 MG per capsule    DYAZIDE    135 capsule    TAKE ONE TABLET DAILY IN MORING (OK FOR ONE EXTRA PILL DAILY IF NEEDED FOR EDEMA).

## 2017-05-25 NOTE — PROGRESS NOTES
Current Eye Medications:  cataractive3 four times a day  Right eye     Subjective:  Po2 od   Pt reports sees well and his eye feels fine.     Objective:  See Ophthalmology Exam.      Assessment:  Ishmael Whitman is a 62 year old male who presents with:     Pseudophakia of right eye Po2, doing well     Combined form of age-related cataract, left eye        Plan:  *   For the right eye:   Continue CatarActive3 four times a day until the bottle runs out.  *   Stop wearing eye shield.  *   No eye rubbing. May resume heavy lifting.  *   If you are taking glaucoma drops, continue as usual.  *   Return one month for final exam with glasses check.  *   If your vision worsens, eye becomes increasingly red, or becomes painful, call [phone].    Jordana Vazquez M.D.

## 2017-05-25 NOTE — PATIENT INSTRUCTIONS
*   For the right eye:     Continue CatarActive3 four times a day until the bottle runs out.    *   Stop wearing eye shield.    *   No eye rubbing. May resume heavy lifting.    *   If you are taking glaucoma drops, continue as usual.    *   Return one month for final exam with glasses check.    *   If your vision worsens, eye becomes increasingly red, or becomes painful, call [phone].    Jordana Vazquez M.D.

## 2017-06-28 ENCOUNTER — OFFICE VISIT (OUTPATIENT)
Dept: OPHTHALMOLOGY | Facility: CLINIC | Age: 62
End: 2017-06-28
Payer: COMMERCIAL

## 2017-06-28 DIAGNOSIS — H25.812 COMBINED FORM OF AGE-RELATED CATARACT, LEFT EYE: ICD-10-CM

## 2017-06-28 DIAGNOSIS — H43.811 POSTERIOR VITREOUS DETACHMENT OF RIGHT EYE: ICD-10-CM

## 2017-06-28 DIAGNOSIS — Z96.1 PSEUDOPHAKIA OF RIGHT EYE: Primary | ICD-10-CM

## 2017-06-28 PROCEDURE — 99024 POSTOP FOLLOW-UP VISIT: CPT | Performed by: STUDENT IN AN ORGANIZED HEALTH CARE EDUCATION/TRAINING PROGRAM

## 2017-06-28 ASSESSMENT — EXTERNAL EXAM - RIGHT EYE: OD_EXAM: NORMAL

## 2017-06-28 ASSESSMENT — SLIT LAMP EXAM - LIDS
COMMENTS: NORMAL
COMMENTS: NORMAL

## 2017-06-28 ASSESSMENT — REFRACTION_WEARINGRX
OS_SPHERE: +1.50
SPECS_TYPE: OTC
OD_SPHERE: +1.50
OS_CYLINDER: SPHERE
OD_CYLINDER: SPHERE

## 2017-06-28 ASSESSMENT — VISUAL ACUITY
METHOD: SNELLEN - LINEAR
OS_SC: 20/20
OD_SC: 20/15

## 2017-06-28 ASSESSMENT — EXTERNAL EXAM - LEFT EYE: OS_EXAM: NORMAL

## 2017-06-28 ASSESSMENT — CUP TO DISC RATIO: OD_RATIO: 0.25

## 2017-06-28 ASSESSMENT — REFRACTION_MANIFEST
OS_SPHERE: PLANO
OS_ADD: +2.75
OD_SPHERE: +0.25
OS_CYLINDER: +0.50
OS_AXIS: 085
OD_ADD: +2.75
OD_CYLINDER: SPHERE

## 2017-06-28 ASSESSMENT — TONOMETRY
IOP_METHOD: TONOPEN
OD_IOP_MMHG: 10

## 2017-06-28 NOTE — PATIENT INSTRUCTIONS
*  Discontinue all drops, unless used prior to cataract surgery.    *  Fill prescription for new glasses or drugstore readers.    *  Return to clinic in 1 year for a complete eye exam or earlier if problems should arise.    Jordana Vazquez M.D.  [phone]

## 2017-06-28 NOTE — MR AVS SNAPSHOT
After Visit Summary   6/28/2017    Ishmael Whitman    MRN: [id number]           Patient Information     Date Of Birth          1955        Visit Information        Provider Department      6/28/2017 8:15 AM Jordana Vazquez MD [facility]        Today's Diagnoses     Pseudophakia of right eye    -  1    Combined form of age-related cataract, left eye        Posterior vitreous detachment of right eye          Care Instructions    *  Discontinue all drops, unless used prior to cataract surgery.    *  Fill prescription for new glasses or drugstore readers.    *  Return to clinic in 1 year for a complete eye exam or earlier if problems should arise.    Jordana Vazquez M.D.  [phone]          Follow-ups after your visit        Follow-up notes from your care team     Return in about 1 year (around 6/28/2018) for Complete Exam.      Who to contact     If you have questions or need follow up information about today's clinic visit or your schedule please contact [facility] directly at [phone].  Normal or non-critical lab and imaging results will be communicated to you by [employer], letter or phone within 4 business days after the clinic has received the results. If you do not hear from us within 7 days, please contact the clinic through [employer] or phone. If you have a critical or abnormal lab result, we will notify you by phone as soon as possible.  Submit refill requests through [employer] or call your pharmacy and they will forward the refill request to us. Please allow 3 business days for your refill to be completed.          Additional Information About Your Visit        MyChart Information     [employer] gives you secure access to your electronic health record. If you see a primary care provider, you can also send messages to your care team and make appointments. If you have questions, please call your primary care clinic.  If you do not have a primary care provider,  please call [phone] and they will assist you.        Care EveryWhere ID     This is your Care EveryWhere ID. This could be used by other organizations to access your [place] medical records  [id number]         Blood Pressure from Last 3 Encounters:   05/17/17 139/75   05/04/17 130/80   06/09/16 131/82    Weight from Last 3 Encounters:   05/04/17 88.5 kg (195 lb)   06/09/16 94.3 kg (208 lb)   12/17/15 92.5 kg (204 lb)              Today, you had the following     No orders found for display       Primary Care Provider Office Phone # Fax #    Giovanni Deshaun Calixto [phone]       [facility] [address] [facility] [address]        Equal Access to Services     LAURA MENDOZA : Hadii maría iyer hadasho Soomaali, waaxda luqadaha, qaybta kaalmada adeegyada, waxcielo cruz haychong hernandez . So [facility] [phone].    ATENCIÓN: Si habla español, tiene a yu disposición servicios gratuitos de asistencia lingüística. Vidya al [phone].    We comply with applicable federal civil rights laws and [place] laws. We do not discriminate on the basis of race, color, national origin, age, disability sex, sexual orientation or gender identity.            Thank you!     Thank you for choosing [facility]  for your care. Our goal is always to provide you with excellent care. Hearing back from our patients is one way we can continue to improve our services. Please take a few minutes to complete the written survey that you may receive in the mail after your visit with us. Thank you!             Your Updated Medication List - Protect others around you: Learn how to safely use, store and throw away your medicines at www.disposemymeds.org.          This list is accurate as of: 6/28/17  8:35 AM.  Always use your most recent med list.                   Brand Name Dispense Instructions for use Diagnosis    amLODIPine 5 MG tablet    NORVASC    90 tablet    Take 1 tablet (5 mg) by mouth  daily for blood pressure take in AM    Hypertension goal BP (blood pressure) < 140/90       aspirin 81 MG tablet      Take 1 tablet by mouth daily.        atenolol 100 MG tablet    TENORMIN    90 tablet    Take 1 tablet (100 mg) by mouth daily At bedtime for blood pressure.    Hypertension goal BP (blood pressure) < 140/90       atorvastatin 40 MG tablet    LIPITOR    90 tablet    TAKE 1 TABLET BY MOUTH EVERY DAY FOR CHOLESTEROL    Hyperlipidemia LDL goal <130       Bromfenac Sodium Powd     1 Bottle    1 Bottle 4 times daily 1 drop four times a day into the operative eye starting 1 day before surgery. Use until bottle runs out.    Combined form of age-related cataract, both eyes       Dexamethasone Acetate Powd     1 Bottle    1 Bottle 4 times daily 1 drop four times a day into the operative eye starting 1 day before surgery. Use until bottle runs out.    Combined form of age-related cataract, both eyes       ibuprofen 200 MG tablet    ADVIL/MOTRIN     200 mg. 4 tabs daily when needed        Moxifloxacin HCl Powd     1 Bottle    1 Bottle 4 times daily 1 drop four times a day into the operative eye starting 1 day before surgery. Use until bottle runs out.    Combined form of age-related cataract, both eyes       omeprazole 20 MG CR capsule    priLOSEC    90 capsule    Take 1 capsule (20 mg) by mouth daily as needed To protect stomach    Gastritis       triamterene-hydrochlorothiazide 37.5-25 MG per capsule    DYAZIDE    135 capsule    TAKE ONE TABLET DAILY IN MORING (OK FOR ONE EXTRA PILL DAILY IF NEEDED FOR EDEMA).    Essential hypertension with goal blood pressure less than 140/90

## 2017-06-28 NOTE — PROGRESS NOTES
" Current Eye Medications:  Cataractive 3 qid od     Subjective:  Final MR right eye.  Doing great.  It's \"incredible\"     Objective:  See Ophthalmology Exam.      Assessment:  Ishmael Whitman is a 62 year old male who presents with:     Pseudophakia of right eye Final right eye, doing great.      Combined form of age-related cataract, left eye Not visually significant      Posterior vitreous detachment of right eye      Plan:  *  Discontinue all drops, unless used prior to cataract surgery.  *  Fill prescription for new glasses or drugstore readers.  *  Return to clinic in 1 year for a complete eye exam or earlier if problems should arise.    Jordana Vazquez M.D.  [phone]         "

## 2017-08-05 DIAGNOSIS — E78.5 HYPERLIPIDEMIA LDL GOAL <130: ICD-10-CM

## 2017-08-07 RX ORDER — ATORVASTATIN CALCIUM 40 MG/1
TABLET, FILM COATED ORAL
Qty: 90 TABLET | Refills: 2 | Status: SHIPPED | OUTPATIENT
Start: 2017-08-07 | End: 2018-04-28

## 2017-08-21 DIAGNOSIS — I10 HYPERTENSION GOAL BP (BLOOD PRESSURE) < 140/90: ICD-10-CM

## 2017-08-21 RX ORDER — ATENOLOL 100 MG/1
TABLET ORAL
Qty: 90 TABLET | Refills: 2 | Status: SHIPPED | OUTPATIENT
Start: 2017-08-21 | End: 2018-04-27

## 2017-08-21 RX ORDER — AMLODIPINE BESYLATE 5 MG/1
TABLET ORAL
Qty: 90 TABLET | Refills: 2 | Status: SHIPPED | OUTPATIENT
Start: 2017-08-21 | End: 2018-04-27

## 2017-09-19 DIAGNOSIS — I10 ESSENTIAL HYPERTENSION WITH GOAL BLOOD PRESSURE LESS THAN 140/90: ICD-10-CM

## 2017-09-19 RX ORDER — TRIAMTERENE AND HYDROCHLOROTHIAZIDE 37.5; 25 MG/1; MG/1
CAPSULE ORAL
Qty: 135 CAPSULE | Refills: 0 | Status: SHIPPED | OUTPATIENT
Start: 2017-09-19 | End: 2017-12-23

## 2017-10-22 DIAGNOSIS — K29.70 GASTRITIS: ICD-10-CM

## 2017-12-23 DIAGNOSIS — I10 ESSENTIAL HYPERTENSION WITH GOAL BLOOD PRESSURE LESS THAN 140/90: ICD-10-CM

## 2017-12-26 RX ORDER — TRIAMTERENE/HYDROCHLOROTHIAZID 37.5-25 MG
TABLET ORAL
Qty: 135 TABLET | Refills: 0 | Status: SHIPPED | OUTPATIENT
Start: 2017-12-26 | End: 2018-03-27

## 2018-01-08 ENCOUNTER — OFFICE VISIT (OUTPATIENT)
Dept: FAMILY MEDICINE | Facility: CLINIC | Age: 63
End: 2018-01-08
Payer: COMMERCIAL

## 2018-01-08 VITALS
SYSTOLIC BLOOD PRESSURE: 140 MMHG | OXYGEN SATURATION: 98 % | HEART RATE: 76 BPM | WEIGHT: 192 LBS | DIASTOLIC BLOOD PRESSURE: 80 MMHG | TEMPERATURE: 98.5 F | BODY MASS INDEX: 29.19 KG/M2

## 2018-01-08 DIAGNOSIS — R60.0 PERIPHERAL EDEMA: ICD-10-CM

## 2018-01-08 DIAGNOSIS — I10 HYPERTENSION GOAL BP (BLOOD PRESSURE) < 140/90: Primary | ICD-10-CM

## 2018-01-08 DIAGNOSIS — G47.00 INSOMNIA, UNSPECIFIED TYPE: ICD-10-CM

## 2018-01-08 DIAGNOSIS — K22.719 BARRETT'S ESOPHAGUS WITH DYSPLASIA: ICD-10-CM

## 2018-01-08 DIAGNOSIS — Z12.11 SPECIAL SCREENING FOR MALIGNANT NEOPLASMS, COLON: ICD-10-CM

## 2018-01-08 LAB
ALBUMIN SERPL-MCNC: 3.9 G/DL (ref 3.4–5)
ALP SERPL-CCNC: 113 U/L (ref 40–150)
ALT SERPL W P-5'-P-CCNC: 28 U/L (ref 0–70)
ANION GAP SERPL CALCULATED.3IONS-SCNC: 9 MMOL/L (ref 3–14)
AST SERPL W P-5'-P-CCNC: 25 U/L (ref 0–45)
BILIRUB SERPL-MCNC: 0.9 MG/DL (ref 0.2–1.3)
BUN SERPL-MCNC: 19 MG/DL (ref 7–30)
CALCIUM SERPL-MCNC: 8.9 MG/DL (ref 8.5–10.1)
CHLORIDE SERPL-SCNC: 96 MMOL/L (ref 94–109)
CO2 SERPL-SCNC: 24 MMOL/L (ref 20–32)
CREAT SERPL-MCNC: 1.06 MG/DL (ref 0.66–1.25)
GFR SERPL CREATININE-BSD FRML MDRD: 71 ML/MIN/1.7M2
GLUCOSE SERPL-MCNC: 85 MG/DL (ref 70–99)
POTASSIUM SERPL-SCNC: 4.2 MMOL/L (ref 3.4–5.3)
PROT SERPL-MCNC: 8.6 G/DL (ref 6.8–8.8)
SODIUM SERPL-SCNC: 129 MMOL/L (ref 133–144)
TSH SERPL DL<=0.005 MIU/L-ACNC: 3.25 MU/L (ref 0.4–4)

## 2018-01-08 PROCEDURE — 84443 ASSAY THYROID STIM HORMONE: CPT | Performed by: FAMILY MEDICINE

## 2018-01-08 PROCEDURE — 36415 COLL VENOUS BLD VENIPUNCTURE: CPT | Performed by: FAMILY MEDICINE

## 2018-01-08 PROCEDURE — 99214 OFFICE O/P EST MOD 30 MIN: CPT | Performed by: FAMILY MEDICINE

## 2018-01-08 PROCEDURE — 80053 COMPREHEN METABOLIC PANEL: CPT | Performed by: FAMILY MEDICINE

## 2018-01-08 RX ORDER — TRIAMTERENE AND HYDROCHLOROTHIAZIDE 37.5; 25 MG/1; MG/1
1 CAPSULE ORAL DAILY
Qty: 60 CAPSULE | Refills: 5 | Status: SHIPPED | OUTPATIENT
Start: 2018-01-08

## 2018-01-08 NOTE — PROGRESS NOTES
SUBJECTIVE:  Ishmael GUTIÉRREZ J Carlos, a 62 year old male scheduled an appointment to discuss the following issues:  Feet swelling.   History htn and got different brand of blood pressure meds got powder pill instead on gel.   Patient believes related to new blood pressure. Watching diet in sodium. Exercise - active. No chest pain or shortness of breath. No nausea, vomiting or diarrhea. No black or bloody stools. No gerd.   Needs repeat egd/colonoscopy - history polyp. No urine changes or hematuria. No outside reading. Vision ok. Emotionally ok.   Some insomnia. No sleep aides. Seeing orthopedist for hip. Taking ibuprofen. Patient changed sleeping position recently with hip.   No history dvt and no travel   Past Medical History:   Diagnosis Date     DJD (degenerative joint disease)     knees     HTN      Nonsenile cataract      Pure hypercholesterolemia        Past Surgical History:   Procedure Laterality Date     COLONOSCOPY  10/28/2011    Procedure:COLONOSCOPY; Colonoscopy, history of polyps, anemia; Surgeon:RIOS NOVA; Location: OR     ORTHOPEDIC SURGERY  10.20.14    right hip replacement     PHACOEMULSIFICATION CLEAR CORNEA WITH STANDARD INTRAOCULAR LENS IMPLANT Right 5/17/2017    Procedure: PHACOEMULSIFICATION CLEAR CORNEA WITH STANDARD INTRAOCULAR LENS IMPLANT;  RIGHT PHACOEMULSIFICATION CLEAR CORNEA WITH STANDARD INTRAOCULAR LENS IMPLANT ;  Surgeon: Jordana Vazquez MD;  Location:  [facility]     VASECTOMY         Family History   Problem Relation Age of Onset     Asthma Mother      emphysema     DIABETES Father      boderline dm     Arthritis Paternal Grandmother      Glaucoma No family hx of      Macular Degeneration No family hx of        Social History   Substance Use Topics     Smoking status: Current Every Day Smoker     Packs/day: 1.00     Years: 30.00     Types: Cigarettes     Smokeless tobacco: Never Used     Alcohol use Yes       ROS:  All other ROS negative.   OBJECTIVE:  /80  Pulse 76  Temp 98.5   F (36.9  C) (Oral)  Wt 192 lb (87.1 kg)  SpO2 98%  BMI 29.19 kg/m2  EXAM:  GENERAL APPEARANCE: healthy, alert and no distress  EYES: EOMI,  PERRL  NECK: no adenopathy, no asymmetry, masses, or scars and thyroid normal to palpation  RESP: lungs clear to auscultation - no rales, rhonchi or wheezes  CV: regular rates and rhythm, normal S1 S2, no S3 or S4 and no murmur, click or rub -  ABDOMEN:  soft, nontender, no HSM or masses and bowel sounds normal  MS: extremities normal- no gross deformities noted, no evidence of inflammation in joints, FROM in all extremities.  MS: trace bilateral LOWER EXTREMETIES edema. No calf pain/swelling or cords.   NEURO: Normal strength and tone, sensory exam grossly normal, mentation intact and speech normal  PSYCH: mentation appears normal and affect normal/bright    ASSESSMENT / PLAN:  (I10) Hypertension goal BP (blood pressure) < 140/90  (primary encounter diagnosis)  Comment: needs help  Plan: triamterene-hydrochlorothiazide (DYAZIDE)         37.5-25 MG per capsule, Comprehensive metabolic        panel (BMP + Alb, Alk Phos, ALT, AST, Total.         Bili, TP)        Can double dosage. Recheck in 3 months  Sooner if worse. Self-monitor/exercise and limit sodium. Chest pain or shortness of breath to er.     (R60.9) Peripheral edema  Comment: likely from new pills - patient thinks timing is right but possible from changing sleep position from hit pain  Plan: triamterene-hydrochlorothiazide (DYAZIDE)         37.5-25 MG per capsule, Comprehensive metabolic        panel (BMP + Alb, Alk Phos, ALT, AST, Total.         Bili, TP), TSH with free T4 reflex        Our pharmacy has capsule. Can take 2 pills for next 3-5 days to get fluid ok. Elevate/exercise. Await labs. Return to clinic if not improving overall in next week. Consider stopping norvasc? Limit sodium.     (Z12.11) Special screening for malignant neoplasms, colon  Plan: GASTROENTEROLOGY ADULT REF PROCEDURE ONLY        Overdue for  history polyps - # for [place] gi.     (K25.495) Manzanares's esophagus with dysplasia  Comment: stable?  Plan: GASTROENTEROLOGY ADULT REF PROCEDURE ONLY        Limit caffeine/nsaids and ALCOHOL. Due for repeat egd.     (G47.00) Insomnia, unspecified type  Comment: needs help. Pain and insomnia  Plan: amitriptyline (ELAVIL) 25 MG tablet        Reveiwed risks and side effects of medication  Consider trazodone or gabapentin too. If SUICIAL IDEATION OR HOMOCIDAL IDEATION OR KOBY TO ER. Recheck in 3 months  Sooner if worse. Call/email with questions/concerns    Giovanni Calixto

## 2018-01-08 NOTE — MR AVS SNAPSHOT
After Visit Summary   1/8/2018    Ishmael Whitman    MRN: [id number]           Patient Information     Date Of Birth          1955        Visit Information        Provider Department      1/8/2018 3:50 PM Giovanni Calixto MD [facility]        Today's Diagnoses     Hypertension goal BP (blood pressure) < 140/90    -  1    Peripheral edema        Special screening for malignant neoplasms, colon        Manzanares's esophagus with dysplasia        Insomnia, unspecified type           Follow-ups after your visit        Additional Services     GASTROENTEROLOGY ADULT REF PROCEDURE ONLY       Last Lab Result: Creatinine (mg/dL)       Date                     Value                 05/04/2017               0.85             ----------  Body mass index is 29.19 kg/(m^2).     Needed:  No  Language:  English    Patient will be contacted to schedule procedure.     Please be aware that coverage of these services is subject to the terms and limitations of your health insurance plan.  Call member services at your health plan with any benefit or coverage questions.  Any procedures must be performed at a [place] facility OR coordinated by your clinic's referral office.    Please bring the following with you to your appointment:    (1) Any X-Rays, CTs or MRIs which have been performed.  Contact the facility where they were done to arrange for  prior to your scheduled appointment.    (2) List of current medications   (3) This referral request   (4) Any documents/labs given to you for this referral                  Your next 10 appointments already scheduled     Jignesh 10, 2018  9:00 AM CST   [facility]    [address] [facility] [address] [phone]              Who to contact     If you have questions or need follow up information about today's clinic visit or your schedule please contact  [facility] directly at [phone].  Normal or non-critical lab and imaging results will be communicated to you by MyChart, letter or phone within 4 business days after the clinic has received the results. If you do not hear from us within 7 days, please contact the clinic through [employer] or phone. If you have a critical or abnormal lab result, we will notify you by phone as soon as possible.  Submit refill requests through [employer] or call your pharmacy and they will forward the refill request to us. Please allow 3 business days for your refill to be completed.          Additional Information About Your Visit        [employer] Information     [employer] gives you secure access to your electronic health record. If you see a primary care provider, you can also send messages to your care team and make appointments. If you have questions, please call your primary care clinic.  If you do not have a primary care provider, please call [phone] and they will assist you.        Care EveryWhere ID     This is your Care EveryWhere ID. This could be used by other organizations to access your [place] medical records  [id number]        Your Vitals Were     Pulse Temperature Pulse Oximetry BMI (Body Mass Index)          76 98.5  F (36.9  C) (Oral) 98% 29.19 kg/m2         Blood Pressure from Last 3 Encounters:   01/08/18 140/80   05/17/17 139/75   05/04/17 130/80    Weight from Last 3 Encounters:   01/08/18 192 lb (87.1 kg)   05/04/17 195 lb (88.5 kg)   06/09/16 208 lb (94.3 kg)              We Performed the Following     Comprehensive metabolic panel (BMP + Alb, Alk Phos, ALT, AST, Total. Bili, TP)     GASTROENTEROLOGY ADULT REF PROCEDURE ONLY     TSH with free T4 reflex          Today's Medication Changes          These changes are accurate as of: 1/8/18  4:17 PM.  If you have any questions, ask your nurse or doctor.               Start taking these medicines.        Dose/Directions    amitriptyline 25 MG tablet    Commonly known as:  ELAVIL   Used for:  Insomnia, unspecified type   Started by:  Giovanni Calixto MD        Dose:  25 mg   Take 1 tablet (25 mg) by mouth At Bedtime As needed for sleep/pain   Quantity:  30 tablet   Refills:  2         These medicines have changed or have updated prescriptions.        Dose/Directions    * triamterene-hydrochlorothiazide 37.5-25 MG per tablet   Commonly known as:  MAXZIDE-25   This may have changed:  Another medication with the same name was added. Make sure you understand how and when to take each.   Used for:  Essential hypertension with goal blood pressure less than 140/90   Changed by:  Giovanni Calixto MD        TAKE ONE TABLET DAILY IN MORING (OK FOR ONE EXTRA PILL DAILY IF NEEDED FOR EDEMA).   Quantity:  135 tablet   Refills:  0       * triamterene-hydrochlorothiazide 37.5-25 MG per capsule   Commonly known as:  DYAZIDE   This may have changed:  You were already taking a medication with the same name, and this prescription was added. Make sure you understand how and when to take each.   Used for:  Hypertension goal BP (blood pressure) < 140/90, Peripheral edema   Changed by:  Giovanni Calixto MD        Dose:  1 capsule   Take 1 capsule by mouth daily Capsule please. May take extra pill as needed for leg swelling   Quantity:  60 capsule   Refills:  5       * Notice:  This list has 2 medication(s) that are the same as other medications prescribed for you. Read the directions carefully, and ask your doctor or other care provider to review them with you.         Where to get your medicines      These medications were sent to [facility] [address] [facility] [place] [address]  [address] [address] [address]     Phone:  [phone]     amitriptyline 25 MG tablet    triamterene-hydrochlorothiazide 37.5-25 MG per capsule                Primary Care Provider Office Phone # Fax #    Giovanni Calixto [phone]  [address] [facility] [address]        Equal Access to Services     LAURA MENDOZA : Hadii maría iyer hadaleahkeysha Sotkali, waaxda luqadaha, qaybta kaaliciacandido louis, rimma bettencourtyannickchiki turcios. So [facility] [phone].    ATENCIÓN: Si habla español, tiene a yu disposición servicios gratuitos de asistencia lingüística. [facility] [phone].    We comply with applicable federal civil rights laws and [place] laws. We do not discriminate on the basis of race, color, national origin, age, disability, sex, sexual orientation, or gender identity.            Thank you!     Thank you for choosing [facility]  for your care. Our goal is always to provide you with excellent care. Hearing back from our patients is one way we can continue to improve our services. Please take a few minutes to complete the written survey that you may receive in the mail after your visit with us. Thank you!             Your Updated Medication List - Protect others around you: Learn how to safely use, store and throw away your medicines at www.disposemymeds.org.          This list is accurate as of: 1/8/18  4:17 PM.  Always use your most recent med list.                   Brand Name Dispense Instructions for use Diagnosis    amitriptyline 25 MG tablet    ELAVIL    30 tablet    Take 1 tablet (25 mg) by mouth At Bedtime As needed for sleep/pain    Insomnia, unspecified type       amLODIPine 5 MG tablet    NORVASC    90 tablet    TAKE 1 TABLET (5 MG) BY MOUTH DAILY FOR BLOOD PRESSURE TAKE IN AM    Hypertension goal BP (blood pressure) < 140/90       aspirin 81 MG tablet      Take 1 tablet by mouth daily.        atenolol 100 MG tablet    TENORMIN    90 tablet    TAKE 1 TABLET (100 MG) BY MOUTH DAILY AT BEDTIME FOR BLOOD PRESSURE.    Hypertension goal BP (blood pressure) < 140/90       atorvastatin 40 MG tablet    LIPITOR    90 tablet    TAKE 1 TABLET BY MOUTH EVERY DAY FOR CHOLESTEROL    Hyperlipidemia LDL goal <130       Bromfenac  Sodium Powd     1 Bottle    1 Bottle 4 times daily 1 drop four times a day into the operative eye starting 1 day before surgery. Use until bottle runs out.    Combined form of age-related cataract, both eyes       Dexamethasone Acetate Powd     1 Bottle    1 Bottle 4 times daily 1 drop four times a day into the operative eye starting 1 day before surgery. Use until bottle runs out.    Combined form of age-related cataract, both eyes       ibuprofen 200 MG tablet    ADVIL/MOTRIN     200 mg. 4 tabs daily when needed        Moxifloxacin HCl Powd     1 Bottle    1 Bottle 4 times daily 1 drop four times a day into the operative eye starting 1 day before surgery. Use until bottle runs out.    Combined form of age-related cataract, both eyes       omeprazole 20 MG CR capsule    priLOSEC    90 capsule    TAKE ONE CAPSULE BY MOUTH EVERY DAY AS NEEDED TO PROTECT STOMACH    Gastritis       * triamterene-hydrochlorothiazide 37.5-25 MG per tablet    MAXZIDE-25    135 tablet    TAKE ONE TABLET DAILY IN [facility] (OK FOR ONE EXTRA PILL DAILY IF NEEDED FOR EDEMA).    Essential hypertension with goal blood pressure less than 140/90       * triamterene-hydrochlorothiazide 37.5-25 MG per capsule    DYAZIDE    60 capsule    Take 1 capsule by mouth daily Capsule please. May take extra pill as needed for leg swelling    Hypertension goal BP (blood pressure) < 140/90, Peripheral edema       * Notice:  This list has 2 medication(s) that are the same as other medications prescribed for you. Read the directions carefully, and ask your doctor or other care provider to review them with you.

## 2018-01-08 NOTE — NURSING NOTE
"Chief Complaint   Patient presents with     Swelling     feet       Initial /80  Pulse 76  Temp 98.5  F (36.9  C) (Oral)  Wt 192 lb (87.1 kg)  SpO2 98%  BMI 29.19 kg/m2 Estimated body mass index is 29.19 kg/(m^2) as calculated from the following:    Height as of 5/4/17: 5' 8\" (1.727 m).    Weight as of this encounter: 192 lb (87.1 kg).  Medication Reconciliation: complete   Blanca Plaza CMA    "

## 2018-01-09 NOTE — PROGRESS NOTES
SUBJECTIVE:  Ishmael Whitman is a 62 year old male who is seen in follow-up for left hip Osteoarthritis.   Symptoms: constant the past 8 weeks, intermittent before that  Aggravating Activities: pain walking, at night, sitting.  Only relief comes in a recliner    Symptoms have been worsening .    Treatment up to this point: NO corticosteroid injections. Takes NSAIDS regularly    Orthopedic PMH : Right total hip arthroplasty on 10/20/14, with good success.     Past medical history:   Past Medical History:   Diagnosis Date     DJD (degenerative joint disease)     knees     HTN      Nonsenile cataract      Pure hypercholesterolemia        Surgical:   Past Surgical History:   Procedure Laterality Date     COLONOSCOPY  10/28/2011    Procedure:COLONOSCOPY; Colonoscopy, history of polyps, anemia; Surgeon:RIOS NOVA; Location: OR     ORTHOPEDIC SURGERY  10.20.14    right hip replacement     PHACOEMULSIFICATION CLEAR CORNEA WITH STANDARD INTRAOCULAR LENS IMPLANT Right 5/17/2017    Procedure: PHACOEMULSIFICATION CLEAR CORNEA WITH STANDARD INTRAOCULAR LENS IMPLANT;  RIGHT PHACOEMULSIFICATION CLEAR CORNEA WITH STANDARD INTRAOCULAR LENS IMPLANT ;  Surgeon: Jordana Vazquez MD;  Location:  [facility]     VASECTOMY         Family Hx:    Family History   Problem Relation Age of Onset     Asthma Mother      emphysema     DIABETES Father      boderline dm     Arthritis Paternal Grandmother      Glaucoma No family hx of      Macular Degeneration No family hx of        Social Hx:   Social History     Social History     Marital status:      Spouse name: N/A     Number of children: N/A     Years of education: N/A     Social History Main Topics     Smoking status: Current Every Day Smoker     Packs/day: 1.00     Years: 30.00     Types: Cigarettes     Smokeless tobacco: Never Used     Alcohol use Yes     Drug use: No     Sexual activity: Yes     Partners: Female     Other Topics Concern     None     Social History Narrative  "      REVIEW OF SYSTEMS:    CONSTITUTIONAL:  NEGATIVE for fever, chills, change in weight  INTEGUMENTARY/SKIN:  NEGATIVE for worrisome rashes, moles or lesions  EYES:  NEGATIVE for vision changes or irritation  ENT/MOUTH:  NEGATIVE for ear, mouth and throat problems  RESP:  NEGATIVE for significant cough or SOB  BREAST:  NEGATIVE for masses, tenderness or discharge  CV:  NEGATIVE for chest pain, palpitations or peripheral edema  GI:  NEGATIVE for nausea, abdominal pain, heartburn, or change in bowel habits  :  Negative   MUSCULOSKELETAL:  See HPI above  NEURO:  NEGATIVE for weakness, dizziness or paresthesias  ENDOCRINE:  NEGATIVE for temperature intolerance, skin/hair changes  HEME/ALLERGY/IMMUNE:  NEGATIVE for bleeding problems  PSYCHIATRIC:  NEGATIVE for changes in mood or affect    Resp 16  Ht 1.727 m (5' 8\")  Wt 85.7 kg (189 lb)  BMI 28.74 kg/m2    EXAM:  GENERAL APPEARANCE: healthy, alert and no distress   GAIT: antalgic favoring left   SKIN: no suspicious lesions or rashes  NEURO: normal strength and tone, sentation intact, reflexes normal, DTR symmetrically normal in upper extremities and DTR symmetrically normal in lower extremities  PSYCH:  mentation appears normal and affect normal/bright  VASCULAR:  pulses intact, good cappillary refill  LYMPH:  no lymphadenopathy  RESP:  no overt rhonchi or weazes    MUSCULOSKELETAL:  LEFT HIP:  Palpation: Tender:   left greater trochanter    Range of Motion:  Left Hip  flexion   100 degrees, extension  No flexion contracture, external rotation  45 degrees, internal rotation  0 degrees, abduction  20 degrees  Strength:  flexion: 5/5, abduction: 5/5  Leg lengths close.    Seated SLR: negative but tightness bilaterally  Supine SLR: negative but tightness bilaterally  Sensation:normal  Motor: all normal  Pathologic reflexes: None    X-RAY INTERPRETATION:  Severe, end stage osteoarthritis of left hip, with multiple cysts.  Advanced from 2015, but it was severe then as " well.    ASSESSMENT/PLAN:  (M16.12) Primary osteoarthritis of left hip  (primary encounter diagnosis)  Comment: end stage, severe.  ER contracture.  Plan:  traMADol       (ULTRAM) 50 MG tablet          Total Hip Arthroplasty: We talked about the patient's condition and diagnosis.  Because of severe arthritis, and failure of conservative measures, I have suggested total hip arthroplasty of the l hip.  I've talked in depth about the procedure and the risks, which include, but are not limited to blood loss requiring transfusion, infection, neurovascular injury, DVT, PE, continued pain, (both perioperative and persistent post-recovery pain), dislocation, leg length discrepancy, intra-operative fracture, potential anesthetic and perioperative medical complications, and the possibility of needing additional procedures. We also talked about recovery time, which differs from patient to patient.  We've encouraged attendance at the arthroplasty class at the hospital.  Medical clearance will need to be obtained.  Special considerations will need grafting of acetabular cysts. He should do this in the next few months.  Has a trip to [facility] in February, and will do this after the trip.  Sizin.  58 cup.  But we need to check sizes from right hip in . We would not expect these sizes from someone his height.      (Z96.641) History of total right hip arthroplasty  Comment: doing well.    Total time spent was 30 minutes; with 25 minutes spent face-to-face with patient dedicated to education, counseling and a development of a treatment plan.      TRUDY Bledsoe MD  Dept. Orthopedic Surgery  [facility]

## 2018-01-10 ENCOUNTER — RADIANT APPOINTMENT (OUTPATIENT)
Dept: GENERAL RADIOLOGY | Facility: CLINIC | Age: 63
End: 2018-01-10
Attending: ORTHOPAEDIC SURGERY
Payer: COMMERCIAL

## 2018-01-10 ENCOUNTER — OFFICE VISIT (OUTPATIENT)
Dept: ORTHOPEDICS | Facility: CLINIC | Age: 63
End: 2018-01-10
Payer: COMMERCIAL

## 2018-01-10 VITALS — HEIGHT: 68 IN | RESPIRATION RATE: 16 BRPM | WEIGHT: 189 LBS | BODY MASS INDEX: 28.64 KG/M2

## 2018-01-10 DIAGNOSIS — Z96.641 HISTORY OF TOTAL RIGHT HIP ARTHROPLASTY: ICD-10-CM

## 2018-01-10 DIAGNOSIS — M16.12 PRIMARY OSTEOARTHRITIS OF LEFT HIP: Primary | ICD-10-CM

## 2018-01-10 DIAGNOSIS — M16.12 PRIMARY OSTEOARTHRITIS OF LEFT HIP: ICD-10-CM

## 2018-01-10 PROCEDURE — 73523 X-RAY EXAM HIPS BI 5/> VIEWS: CPT | Mod: FY

## 2018-01-10 PROCEDURE — 99214 OFFICE O/P EST MOD 30 MIN: CPT | Performed by: ORTHOPAEDIC SURGERY

## 2018-01-10 RX ORDER — TRAMADOL HYDROCHLORIDE 50 MG/1
50 TABLET ORAL EVERY 6 HOURS PRN
Qty: 50 TABLET | Refills: 0 | Status: SHIPPED | OUTPATIENT
Start: 2018-01-10

## 2018-01-10 ASSESSMENT — PAIN SCALES - GENERAL: PAINLEVEL: MODERATE PAIN (4)

## 2018-01-10 NOTE — MR AVS SNAPSHOT
"              After Visit Summary   1/10/2018    Ishmael Whitman    MRN: [id number]           Patient Information     Date Of Birth          1955        Visit Information        Provider Department      1/10/2018 9:00 AM Geoff Bledsoe MD [facility]        Today's Diagnoses     Primary osteoarthritis of left hip    -  1    History of total right hip arthroplasty           Follow-ups after your visit        Who to contact     If you have questions or need follow up information about today's clinic visit or your schedule please contact [facility] directly at [phone].  Normal or non-critical lab and imaging results will be communicated to you by [employer], letter or phone within 4 business days after the clinic has received the results. If you do not hear from us within 7 days, please contact the clinic through [employer] or phone. If you have a critical or abnormal lab result, we will notify you by phone as soon as possible.  Submit refill requests through [employer] or call your pharmacy and they will forward the refill request to us. Please allow 3 business days for your refill to be completed.          Additional Information About Your Visit        MyChart Information     [employer] gives you secure access to your electronic health record. If you see a primary care provider, you can also send messages to your care team and make appointments. If you have questions, please call your primary care clinic.  If you do not have a primary care provider, please call [phone] and they will assist you.        Care EveryWhere ID     This is your Care EveryWhere ID. This could be used by other organizations to access your [facility] medical records  [id number]        Your Vitals Were     Respirations Height BMI (Body Mass Index)             16 1.727 m (5' 8\") 28.74 kg/m2          Blood Pressure from Last 3 Encounters:   01/08/18 140/80   05/17/17 139/75   05/04/17 130/80    Weight from Last " 3 Encounters:   01/10/18 85.7 kg (189 lb)   01/08/18 87.1 kg (192 lb)   05/04/17 88.5 kg (195 lb)                 Today's Medication Changes          These changes are accurate as of: 1/10/18 12:21 PM.  If you have any questions, ask your nurse or doctor.               Start taking these medicines.        Dose/Directions    traMADol 50 MG tablet   Commonly known as:  ULTRAM   Used for:  Primary osteoarthritis of left hip   Started by:  Geoff Bledsoe MD        Dose:  50 mg   Take 1 tablet (50 mg) by mouth every 6 hours as needed for pain   Quantity:  50 tablet   Refills:  0            Where to get your medicines      Some of these will need a paper prescription and others can be bought over the counter.  Ask your nurse if you have questions.     Bring a paper prescription for each of these medications     traMADol 50 MG tablet                Primary Care Provider Office Phone # Fax #    Giovanni Deshaun Calixto [phone] [address] [facility] [address]        Equal Access to Services     [facility] Campus[facility] AH: Hadii maría ku hadasho Soomaali, waaxda luqadaha, qaybta kaalmada adeegyada, waxay juliánin haychong hernandez . So [facility] [phone].    ATENCIÓN: Si habla español, tiene a yu disposición servicios gratuitos de asistencia lingüística. Llame al [phone].    We comply with applicable federal civil rights laws and [place] laws. We do not discriminate on the basis of race, color, national origin, age, disability, sex, sexual orientation, or gender identity.            Thank you!     Thank you for choosing [facility]  for your care. Our goal is always to provide you with excellent care. Hearing back from our patients is one way we can continue to improve our services. Please take a few minutes to complete the written survey that you may receive in the mail after your visit with us. Thank you!             Your Updated Medication List - Protect others around you:  Learn how to safely use, store and throw away your medicines at www.disposemymeds.org.          This list is accurate as of: 1/10/18 12:21 PM.  Always use your most recent med list.                   Brand Name Dispense Instructions for use Diagnosis    amitriptyline 25 MG tablet    ELAVIL    30 tablet    Take 1 tablet (25 mg) by mouth At Bedtime As needed for sleep/pain    Insomnia, unspecified type       amLODIPine 5 MG tablet    NORVASC    90 tablet    TAKE 1 TABLET (5 MG) BY MOUTH DAILY FOR BLOOD PRESSURE TAKE IN AM    Hypertension goal BP (blood pressure) < 140/90       aspirin 81 MG tablet      Take 1 tablet by mouth daily.        atenolol 100 MG tablet    TENORMIN    90 tablet    TAKE 1 TABLET (100 MG) BY MOUTH DAILY AT BEDTIME FOR BLOOD PRESSURE.    Hypertension goal BP (blood pressure) < 140/90       atorvastatin 40 MG tablet    LIPITOR    90 tablet    TAKE 1 TABLET BY MOUTH EVERY DAY FOR CHOLESTEROL    Hyperlipidemia LDL goal <130       Bromfenac Sodium Powd     1 Bottle    1 Bottle 4 times daily 1 drop four times a day into the operative eye starting 1 day before surgery. Use until bottle runs out.    Combined form of age-related cataract, both eyes       Dexamethasone Acetate Powd     1 Bottle    1 Bottle 4 times daily 1 drop four times a day into the operative eye starting 1 day before surgery. Use until bottle runs out.    Combined form of age-related cataract, both eyes       ibuprofen 200 MG tablet    ADVIL/MOTRIN     200 mg. 4 tabs daily when needed        Moxifloxacin HCl Powd     1 Bottle    1 Bottle 4 times daily 1 drop four times a day into the operative eye starting 1 day before surgery. Use until bottle runs out.    Combined form of age-related cataract, both eyes       omeprazole 20 MG CR capsule    priLOSEC    90 capsule    TAKE ONE CAPSULE BY MOUTH EVERY DAY AS NEEDED TO PROTECT STOMACH    Gastritis       traMADol 50 MG tablet    ULTRAM    50 tablet    Take 1 tablet (50 mg) by mouth every 6  hours as needed for pain    Primary osteoarthritis of left hip       * triamterene-hydrochlorothiazide 37.5-25 MG per tablet    MAXZIDE-25    135 tablet    TAKE ONE TABLET DAILY IN MORING (OK FOR ONE EXTRA PILL DAILY IF NEEDED FOR EDEMA).    Essential hypertension with goal blood pressure less than 140/90       * triamterene-hydrochlorothiazide 37.5-25 MG per capsule    DYAZIDE    60 capsule    Take 1 capsule by mouth daily Capsule please. May take extra pill as needed for leg swelling    Hypertension goal BP (blood pressure) < 140/90, Peripheral edema       * Notice:  This list has 2 medication(s) that are the same as other medications prescribed for you. Read the directions carefully, and ask your doctor or other care provider to review them with you.

## 2018-01-10 NOTE — LETTER
1/10/2018         RE: Ishmael Whitman  [address]  [place] [address]        Dear Colleague,    Thank you for referring your patient, Ishmael Whitman, to the [facility]. Please see a copy of my visit note below.    SUBJECTIVE:  Ishmael Whitman is a 62 year old male who is seen in follow-up for left hip Osteoarthritis.   Symptoms: constant the past 8 weeks, intermittent before that  Aggravating Activities: pain walking, at night, sitting.  Only relief comes in a recliner    Symptoms have been worsening .    Treatment up to this point: NO corticosteroid injections. Takes NSAIDS regularly    Orthopedic PMH : Right total hip arthroplasty on 10/20/14, with good success.     Past medical history:   Past Medical History:   Diagnosis Date     DJD (degenerative joint disease)     knees     HTN      Nonsenile cataract      Pure hypercholesterolemia        Surgical:   Past Surgical History:   Procedure Laterality Date     COLONOSCOPY  10/28/2011    Procedure:COLONOSCOPY; Colonoscopy, history of polyps, anemia; Surgeon:RIOS NOVA; Location: OR     ORTHOPEDIC SURGERY  10.20.14    right hip replacement     PHACOEMULSIFICATION CLEAR CORNEA WITH STANDARD INTRAOCULAR LENS IMPLANT Right 5/17/2017    Procedure: PHACOEMULSIFICATION CLEAR CORNEA WITH STANDARD INTRAOCULAR LENS IMPLANT;  RIGHT PHACOEMULSIFICATION CLEAR CORNEA WITH STANDARD INTRAOCULAR LENS IMPLANT ;  Surgeon: Jordana Vazquez MD;  Location:  [facility]     VASECTOMY         Family Hx:    Family History   Problem Relation Age of Onset     Asthma Mother      emphysema     DIABETES Father      boderline dm     Arthritis Paternal Grandmother      Glaucoma No family hx of      Macular Degeneration No family hx of        Social Hx:   Social History     Social History     Marital status:      Spouse name: N/A     Number of children: N/A     Years of education: N/A     Social History Main Topics     Smoking status: Current Every Day Smoker  "    Packs/day: 1.00     Years: 30.00     Types: Cigarettes     Smokeless tobacco: Never Used     Alcohol use Yes     Drug use: No     Sexual activity: Yes     Partners: Female     Other Topics Concern     None     Social History Narrative       REVIEW OF SYSTEMS:    CONSTITUTIONAL:  NEGATIVE for fever, chills, change in weight  INTEGUMENTARY/SKIN:  NEGATIVE for worrisome rashes, moles or lesions  EYES:  NEGATIVE for vision changes or irritation  ENT/MOUTH:  NEGATIVE for ear, mouth and throat problems  RESP:  NEGATIVE for significant cough or SOB  BREAST:  NEGATIVE for masses, tenderness or discharge  CV:  NEGATIVE for chest pain, palpitations or peripheral edema  GI:  NEGATIVE for nausea, abdominal pain, heartburn, or change in bowel habits  :  Negative   MUSCULOSKELETAL:  See HPI above  NEURO:  NEGATIVE for weakness, dizziness or paresthesias  ENDOCRINE:  NEGATIVE for temperature intolerance, skin/hair changes  HEME/ALLERGY/IMMUNE:  NEGATIVE for bleeding problems  PSYCHIATRIC:  NEGATIVE for changes in mood or affect    Resp 16  Ht 1.727 m (5' 8\")  Wt 85.7 kg (189 lb)  BMI 28.74 kg/m2    EXAM:  GENERAL APPEARANCE: healthy, alert and no distress   GAIT: antalgic favoring left   SKIN: no suspicious lesions or rashes  NEURO: normal strength and tone, sentation intact, reflexes normal, DTR symmetrically normal in upper extremities and DTR symmetrically normal in lower extremities  PSYCH:  mentation appears normal and affect normal/bright  VASCULAR:  pulses intact, good cappillary refill  LYMPH:  no lymphadenopathy  RESP:  no overt rhonchi or weazes    MUSCULOSKELETAL:  LEFT HIP:  Palpation: Tender:   left greater trochanter    Range of Motion:  Left Hip  flexion   100 degrees, extension  No flexion contracture, external rotation  45 degrees, internal rotation  0 degrees, abduction  20 degrees  Strength:  flexion: 5/5, abduction: 5/5  Leg lengths close.    Seated SLR: negative but tightness bilaterally  Supine SLR: " negative but tightness bilaterally  Sensation:normal  Motor: all normal  Pathologic reflexes: None    X-RAY INTERPRETATION:  Severe, end stage osteoarthritis of left hip, with multiple cysts.  Advanced from , but it was severe then as well.    ASSESSMENT/PLAN:  (M16.12) Primary osteoarthritis of left hip  (primary encounter diagnosis)  Comment: end stage, severe.  ER contracture.  Plan:  traMADol       (ULTRAM) 50 MG tablet          Total Hip Arthroplasty: We talked about the patient's condition and diagnosis.  Because of severe arthritis, and failure of conservative measures, I have suggested total hip arthroplasty of the l hip.  I've talked in depth about the procedure and the risks, which include, but are not limited to blood loss requiring transfusion, infection, neurovascular injury, DVT, PE, continued pain, (both perioperative and persistent post-recovery pain), dislocation, leg length discrepancy, intra-operative fracture, potential anesthetic and perioperative medical complications, and the possibility of needing additional procedures. We also talked about recovery time, which differs from patient to patient.  We've encouraged attendance at the arthroplasty class at the hospital.  Medical clearance will need to be obtained.  Special considerations will need grafting of acetabular cysts. He should do this in the next few months.  Has a trip to [facility] in February, and will do this after the trip.  Sizin/127.  58 cup.  But we need to check sizes from right hip in . We would not expect these sizes from someone his height.      (Z96.641) History of total right hip arthroplasty  Comment: doing well.    Total time spent was 30 minutes; with 25 minutes spent face-to-face with patient dedicated to education, counseling and a development of a treatment plan.      TRUDY Bledsoe MD  Dept. Orthopedic Surgery  [facility]          Again, thank you for allowing me to participate in the care of  your patient.        Sincerely,        Geoff Bledsoe MD

## 2018-01-18 ENCOUNTER — TRANSFERRED RECORDS (OUTPATIENT)
Dept: HEALTH INFORMATION MANAGEMENT | Facility: CLINIC | Age: 63
End: 2018-01-18

## 2018-03-27 DIAGNOSIS — I10 ESSENTIAL HYPERTENSION WITH GOAL BLOOD PRESSURE LESS THAN 140/90: ICD-10-CM

## 2018-03-27 RX ORDER — TRIAMTERENE/HYDROCHLOROTHIAZID 37.5-25 MG
TABLET ORAL
Qty: 30 TABLET | Refills: 0 | Status: SHIPPED | OUTPATIENT
Start: 2018-03-27

## 2018-04-18 ENCOUNTER — TRANSFERRED RECORDS (OUTPATIENT)
Dept: HEALTH INFORMATION MANAGEMENT | Facility: CLINIC | Age: 63
End: 2018-04-18

## 2018-04-23 DIAGNOSIS — K29.70 GASTRITIS: ICD-10-CM

## 2018-04-27 DIAGNOSIS — I10 HYPERTENSION GOAL BP (BLOOD PRESSURE) < 140/90: ICD-10-CM

## 2018-04-28 DIAGNOSIS — E78.5 HYPERLIPIDEMIA LDL GOAL <130: ICD-10-CM

## 2018-04-30 ENCOUNTER — MYC MEDICAL ADVICE (OUTPATIENT)
Dept: FAMILY MEDICINE | Facility: CLINIC | Age: 63
End: 2018-04-30

## 2018-04-30 RX ORDER — ATORVASTATIN CALCIUM 40 MG/1
TABLET, FILM COATED ORAL
Qty: 90 TABLET | Refills: 0 | Status: SHIPPED | OUTPATIENT
Start: 2018-04-30

## 2018-04-30 NOTE — TELEPHONE ENCOUNTER
Rx refilled per Donny RN refill protocol.    TC, patient due for:  Lipids   Health Maintenance Due   Topic Date Due     URINE DRUG SCREEN Q1 YR  01/30/1971     PARISH QUESTIONNAIRE 1 YEAR  01/30/1974     HIV SCREEN (SYSTEM ASSIGNED)  01/30/1974     PHQ-9 Q1YR  01/30/1974     INFLUENZA VACCINE (1) 09/01/2017     LIPID MONITORING Q1 YEAR  05/04/2018     Rosalie Cook RN

## 2018-05-01 RX ORDER — ATENOLOL 100 MG/1
TABLET ORAL
Qty: 30 TABLET | Refills: 0 | Status: SHIPPED | OUTPATIENT
Start: 2018-05-01

## 2018-05-01 RX ORDER — AMLODIPINE BESYLATE 5 MG/1
TABLET ORAL
Qty: 30 TABLET | Refills: 0 | Status: SHIPPED | OUTPATIENT
Start: 2018-05-01

## 2018-05-01 NOTE — TELEPHONE ENCOUNTER
BP Readings from Last 6 Encounters:   01/08/18 140/80   05/17/17 139/75   05/04/17 130/80   06/09/16 131/82   12/17/15 157/84   08/25/15 137/88     Prescription approved per [facility] Refill Protocol x 1 mos.  Patient is due for 3 mos follow up for hypertension.  Please help the pt schedule an appointment  Sweta Lambert RN

## 2018-05-16 ENCOUNTER — TRANSFERRED RECORDS (OUTPATIENT)
Dept: HEALTH INFORMATION MANAGEMENT | Facility: CLINIC | Age: 63
End: 2018-05-16

## 2018-11-30 ENCOUNTER — OFFICE VISIT (OUTPATIENT)
Dept: OPHTHALMOLOGY | Facility: CLINIC | Age: 63
End: 2018-11-30
Payer: COMMERCIAL

## 2018-11-30 ENCOUNTER — TELEPHONE (OUTPATIENT)
Dept: OPHTHALMOLOGY | Facility: CLINIC | Age: 63
End: 2018-11-30

## 2018-11-30 DIAGNOSIS — H43.811 POSTERIOR VITREOUS DETACHMENT OF RIGHT EYE: ICD-10-CM

## 2018-11-30 DIAGNOSIS — H52.4 PRESBYOPIA: ICD-10-CM

## 2018-11-30 DIAGNOSIS — H25.812 COMBINED FORM OF AGE-RELATED CATARACT, LEFT EYE: Primary | ICD-10-CM

## 2018-11-30 DIAGNOSIS — Z96.1 PSEUDOPHAKIA OF RIGHT EYE: ICD-10-CM

## 2018-11-30 PROCEDURE — 92014 COMPRE OPH EXAM EST PT 1/>: CPT | Performed by: STUDENT IN AN ORGANIZED HEALTH CARE EDUCATION/TRAINING PROGRAM

## 2018-11-30 PROCEDURE — 92015 DETERMINE REFRACTIVE STATE: CPT | Performed by: STUDENT IN AN ORGANIZED HEALTH CARE EDUCATION/TRAINING PROGRAM

## 2018-11-30 ASSESSMENT — TONOMETRY
OD_IOP_MMHG: 14
OS_IOP_MMHG: 14
IOP_METHOD: APPLANATION

## 2018-11-30 ASSESSMENT — REFRACTION_MANIFEST
OD_SPHERE: PLANO
OS_ADD: +2.50
OS_CYLINDER: SPHERE
OD_ADD: +2.50
OD_CYLINDER: +0.25
OS_SPHERE: -2.00
OD_AXIS: 180

## 2018-11-30 ASSESSMENT — REFRACTION_WEARINGRX
OD_CYLINDER: SPHERE
OS_SPHERE: +2.00
OS_CYLINDER: SPHERE
SPECS_TYPE: READERS
OD_SPHERE: +2.00

## 2018-11-30 ASSESSMENT — SLIT LAMP EXAM - LIDS
COMMENTS: NORMAL
COMMENTS: NORMAL

## 2018-11-30 ASSESSMENT — VISUAL ACUITY
OS_BAT_HIGH: 20/50
OS_SC: 20/100-1
OD_CC: J2+
METHOD: SNELLEN - LINEAR
OS_CC: J2 CLOSE
OD_SC: 20/20

## 2018-11-30 ASSESSMENT — CONF VISUAL FIELD
OS_NORMAL: 1
OD_NORMAL: 1

## 2018-11-30 ASSESSMENT — CUP TO DISC RATIO
OD_RATIO: 0.25
OS_RATIO: 0.2

## 2018-11-30 ASSESSMENT — EXTERNAL EXAM - LEFT EYE: OS_EXAM: NORMAL

## 2018-11-30 ASSESSMENT — EXTERNAL EXAM - RIGHT EYE: OD_EXAM: NORMAL

## 2018-11-30 NOTE — TELEPHONE ENCOUNTER
Type of surgery: Cataract Extraction with Intraocular Lens Implant Left Eye  CPT 97004  Combined form of age-related cataract, left eye [H25.812]  - Primary   Location of surgery: [facility]  Date and time of surgery: 01/07/2019 @ 9:00am  Surgeon: Dr. Vazquez  Pre-Op Appt Date: 12/20/2018  Post-Op Appt Date: 01/08/2019   Packet sent out: Yes  Pre-cert/Authorization completed:  No pre cert needed  Date: 12/03/2018

## 2018-11-30 NOTE — PROGRESS NOTES
Current Eye Medications:  no     Subjective:  Comprehensive eye exam.  Pt reports vision getting blurrier in his left. Glare from lights with night makes vision in this eye much worse . He continues to see well with right eye where he had cataract surgery.     Objective:  See Ophthalmology Exam.       Assessment:  Ishmael Whitman is a 63 year old male who presents with:   Encounter Diagnoses   Name Primary?     Combined form of age-related cataract, left eye BAT 20/50 left eye, offered cataract surgery left eye. Target mostly distance left eye. Dil 6.5, MG ok. Used CA3 last time. Did well during surgery for right eye (was at [facility]).     Pseudophakia of right eye      Posterior vitreous detachment of right eye      Visually significant cataract that is interfering with daily activities of living. Plan for cataract extraction and intraocular lens implant left eye.  Risks, benefits, complications, and alternatives discussed with patient including possibility of limitations from coexistent eye disease and loss of vision. Target refraction and lens options discussed.  Patient understands and wishes to proceed with surgery.     Plan:  Offered cataract surgery left eye at [facility] if you wish  Or return in 1 year for a complete eye exam (could fill glasses prescription)    Jordana Vazquez MD  [phone]

## 2018-11-30 NOTE — PATIENT INSTRUCTIONS
Offered cataract surgery left eye at [facility] if you wish  Or return in 1 year for a complete eye exam (could fill glasses prescription)    Jordana Vazquez MD  [phone]

## 2018-11-30 NOTE — LETTER
11/30/2018         RE: Ishmael Whitman  [address]  [place] [address]        Dear Colleague,    Thank you for referring your patient, Ishmael Whitman, to the [facility].     Heading toward cataract surgery left eye. Please see a copy of my visit note below.     Current Eye Medications:  no     Subjective:  Comprehensive eye exam.  Pt reports vision getting blurrier in his left. Glare from lights with night makes vision in this eye much worse . He continues to see well with right eye where he had cataract surgery.     Objective:  See Ophthalmology Exam.       Assessment:  Ishmael Whitman is a 63 year old male who presents with:   Encounter Diagnoses   Name Primary?     Combined form of age-related cataract, left eye BAT 20/50 left eye, offered cataract surgery left eye. Target mostly distance left eye. Dil 6.5, MG ok. Used CA3 last time. Did well during surgery for right eye (was at [facility]).     Pseudophakia of right eye      Posterior vitreous detachment of right eye      Visually significant cataract that is interfering with daily activities of living. Plan for cataract extraction and intraocular lens implant left eye.  Risks, benefits, complications, and alternatives discussed with patient including possibility of limitations from coexistent eye disease and loss of vision. Target refraction and lens options discussed.  Patient understands and wishes to proceed with surgery.     Plan:  Offered cataract surgery left eye at [facility] if you wish  Or return in 1 year for a complete eye exam (could fill glasses prescription)    Jordana Vazquez MD  [phone]        Again, thank you for allowing me to participate in the care of your patient.        Sincerely,        Jordana Vazquez MD

## 2018-11-30 NOTE — MR AVS SNAPSHOT
After Visit Summary   11/30/2018    Ishmael Whitman    MRN: [id number]           Patient Information     Date Of Birth          1955        Visit Information        Provider Department      11/30/2018 8:00 AM Jordana Vazquez MD [facility]        Today's Diagnoses     Combined form of age-related cataract, left eye    -  1    Pseudophakia of right eye        Posterior vitreous detachment of right eye        Presbyopia          Care Instructions    Offered cataract surgery left eye at [facility] if you wish  Or return in 1 year for a complete eye exam (could fill glasses prescription)    Jordana Vazquez MD  [phone]            Follow-ups after your visit        Follow-up notes from your care team     Return in about 1 year (around 11/30/2019) for Complete Exam or cataract pre-op left eye.      Who to contact     If you have questions or need follow up information about today's clinic visit or your schedule please contact [facility] directly at [phone].  Normal or non-critical lab and imaging results will be communicated to you by [facility], letter or phone within 4 business days after the clinic has received the results. If you do not hear from us within 7 days, please contact the clinic through [employer] or phone. If you have a critical or abnormal lab result, we will notify you by phone as soon as possible.  Submit refill requests through [employer] or call your pharmacy and they will forward the refill request to us. Please allow 3 business days for your refill to be completed.          Additional Information About Your Visit        [facility] Information     [employer] gives you secure access to your electronic health record. If you see a primary care provider, you can also send messages to your care team and make appointments. If you have questions, please call your primary care clinic.  If you do not have a primary care provider, please call  [phone] and they will assist you.        Care EveryWhere ID     This is your Care EveryWhere ID. This could be used by other organizations to access your [place] medical records  [id number]         Blood Pressure from Last 3 Encounters:   01/08/18 140/80   05/17/17 139/75   05/04/17 130/80    Weight from Last 3 Encounters:   01/10/18 85.7 kg (189 lb)   01/08/18 87.1 kg (192 lb)   05/04/17 88.5 kg (195 lb)              We Performed the Following     EYE EXAM (SIMPLE-NONBILLABLE)     REFRACTIVE STATUS          Today's Medication Changes          These changes are accurate as of 11/30/18  8:43 AM.  If you have any questions, ask your nurse or doctor.               Stop taking these medicines if you haven't already. Please contact your care team if you have questions.     Bromfenac Sodium Powd   Stopped by:  Jordana Vazquez MD           Dexamethasone Acetate Powd   Stopped by:  Jordana Vazquez MD           Moxifloxacin HCl Powd   Stopped by:  Jordana Vazquez MD                    Primary Care Provider Office Phone # Fax #    Giovanni Deshaun Calixto [phone] [address] [facility] [address]        Equal Access to Services     LAURA MENDOZA AH: Hadii maría iyer hadasho Soomaali, waaxda luqadaha, qaybta kaalmada adeegyada, rimma turcios. So [facility] [phone].    ATENCIÓN: Si habla español, tiene a yu disposición servicios gratuitos de asistencia lingüística. Llame al [phone].    We comply with applicable federal civil rights laws and [place] laws. We do not discriminate on the basis of race, color, national origin, age, disability, sex, sexual orientation, or gender identity.            Thank you!     Thank you for choosing [facility]  for your care. Our goal is always to provide you with excellent care. Hearing back from our patients is one way we can continue to improve our services. Please take a few minutes to complete the written  survey that you may receive in the mail after your visit with us. Thank you!             Your Updated Medication List - Protect others around you: Learn how to safely use, store and throw away your medicines at www.disposemymeds.org.          This list is accurate as of 11/30/18  8:43 AM.  Always use your most recent med list.                   Brand Name Dispense Instructions for use Diagnosis    amitriptyline 25 MG tablet    ELAVIL    30 tablet    Take 1 tablet (25 mg) by mouth At Bedtime As needed for sleep/pain    Insomnia, unspecified type       amLODIPine 5 MG tablet    NORVASC    30 tablet    TAKE 1 TABLET (5 MG) BY MOUTH DAILY FOR BLOOD PRESSURE TAKE IN AM    Hypertension goal BP (blood pressure) < 140/90       aspirin 81 MG tablet    ASA     Take 1 tablet by mouth daily.        atenolol 100 MG tablet    TENORMIN    30 tablet    TAKE 1 TABLET (100 MG) BY MOUTH DAILY AT BEDTIME FOR BLOOD PRESSURE.    Hypertension goal BP (blood pressure) < 140/90       atorvastatin 40 MG tablet    LIPITOR    90 tablet    TAKE 1 TABLET BY MOUTH EVERY DAY    Hyperlipidemia LDL goal <130       ibuprofen 200 MG tablet    ADVIL/MOTRIN     200 mg. 4 tabs daily when needed        omeprazole 20 MG DR capsule    priLOSEC    90 capsule    TAKE ONE CAPSULE BY MOUTH EVERY DAY AS NEEDED TO PROTECT STOMACH    Gastritis       traMADol 50 MG tablet    ULTRAM    50 tablet    Take 1 tablet (50 mg) by mouth every 6 hours as needed for pain    Primary osteoarthritis of left hip       * triamterene-HCTZ 37.5-25 MG capsule    DYAZIDE    60 capsule    Take 1 capsule by mouth daily Capsule please. May take extra pill as needed for leg swelling    Hypertension goal BP (blood pressure) < 140/90, Peripheral edema       * triamterene-HCTZ 37.5-25 MG tablet    MAXZIDE-25    30 tablet    TAKE 1 TABLET BY MOUTH DAILY IN THE MORNING (OK FOR 1 EXTRA TABLET IF NEEDED)    Essential hypertension with goal blood pressure less than 140/90       * Notice:  This  list has 2 medication(s) that are the same as other medications prescribed for you. Read the directions carefully, and ask your doctor or other care provider to review them with you.

## 2018-12-20 ENCOUNTER — OFFICE VISIT (OUTPATIENT)
Dept: OPHTHALMOLOGY | Facility: CLINIC | Age: 63
End: 2018-12-20
Payer: COMMERCIAL

## 2018-12-20 DIAGNOSIS — Z96.1 PSEUDOPHAKIA OF RIGHT EYE: ICD-10-CM

## 2018-12-20 DIAGNOSIS — H25.812 COMBINED FORM OF AGE-RELATED CATARACT, LEFT EYE: Primary | ICD-10-CM

## 2018-12-20 DIAGNOSIS — H43.811 POSTERIOR VITREOUS DETACHMENT OF RIGHT EYE: ICD-10-CM

## 2018-12-20 PROCEDURE — 92012 INTRM OPH EXAM EST PATIENT: CPT | Performed by: STUDENT IN AN ORGANIZED HEALTH CARE EDUCATION/TRAINING PROGRAM

## 2018-12-20 PROCEDURE — 92136 OPHTHALMIC BIOMETRY: CPT | Mod: 26 | Performed by: STUDENT IN AN ORGANIZED HEALTH CARE EDUCATION/TRAINING PROGRAM

## 2018-12-20 RX ORDER — DEXAMETHASONE ACETATE, MICRO 100 %
1 POWDER (GRAM) MISCELLANEOUS 4 TIMES DAILY
Qty: 1 BOTTLE | Refills: 0 | Status: SHIPPED | OUTPATIENT
Start: 2019-01-13 | End: 2019-02-11

## 2018-12-20 RX ORDER — BROMFENAC SODIUM 100 %
1 POWDER (GRAM) MISCELLANEOUS 4 TIMES DAILY
Qty: 1 BOTTLE | Refills: 0 | Status: SHIPPED | OUTPATIENT
Start: 2019-01-13 | End: 2019-02-11

## 2018-12-20 RX ORDER — MONOCHLOROACETIC ACID
1 CRYSTALS MISCELLANEOUS 4 TIMES DAILY
Qty: 1 BOTTLE | Refills: 0 | Status: SHIPPED | OUTPATIENT
Start: 2019-01-13 | End: 2019-02-11

## 2018-12-20 ASSESSMENT — SLIT LAMP EXAM - LIDS
COMMENTS: NORMAL
COMMENTS: NORMAL

## 2018-12-20 ASSESSMENT — VISUAL ACUITY
OS_SC+: +1
OS_SC: 20/80
OD_SC+: -3
OS_PH_SC: 20/50
METHOD: SNELLEN - LINEAR
OD_SC: 20/15
OS_PH_SC+: -1

## 2018-12-20 ASSESSMENT — EXTERNAL EXAM - LEFT EYE: OS_EXAM: NORMAL

## 2018-12-20 ASSESSMENT — EXTERNAL EXAM - RIGHT EYE: OD_EXAM: NORMAL

## 2018-12-20 NOTE — TELEPHONE ENCOUNTER
SURGERY DATE CHANGE:    Type of surgery: Cataract Extraction with Intraocular Lens Implant Left Eye  Location of surgery: [facility]  Date and time of surgery: 01/14/2019 @ 9:00am  Surgeon: Dr. Vazquez  Pre-Op Appt Date: 12/20/2018  Post-Op Appt Date: 01/15/2019   Packet sent out: Yes  Pre-cert/Authorization completed:  Yes  Date: 12/20/2018

## 2018-12-20 NOTE — PATIENT INSTRUCTIONS
PRE-OP CATARACT INSTRUCTIONS    *Use the following drops in the left eye 4 times on the day before surgery and once the morning of surgery:                                   CatarActive3    *Please bring all your eyedrops to the surgery center.    *No solid food or drink after midnight. Please take the starred medications with a small sip of water the morning of surgery.    *If you are diabetic, please do not take any diabetic medications the morning of surgery.    Jordana Vazquez MD  [phone]

## 2018-12-20 NOTE — PROGRESS NOTES
Current Eye Medications:  None     Subjective:  Here for preop left eye. Used CatarActive3 drops for the right eye, would like to do this again. 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  Color blind, ongoing joke at the office     Objective:  See Ophthalmology Exam.       Assessment:  Ishmael Whitman is a 63 year old male who presents with:   Encounter Diagnoses   Name Primary?     Combined form of age-related cataract, left eye Dil 6.5, CA3, Target mostly distance left eye.      Pseudophakia of right eye      Posterior vitreous detachment of right eye        Plan:  PRE-OP CATARACT INSTRUCTIONS    *Use the following drops in the left eye 4 times on the day before surgery and once the morning of surgery:                                 CatarActive3    *Please bring all your eyedrops to the surgery center.  *No solid food or drink after midnight. Please take the starred medications with a small sip of water the morning of surgery.  *If you are diabetic, please do not take any diabetic medications the morning of surgery.    Jordana Vazquez MD  [phone]

## 2018-12-20 NOTE — LETTER
12/20/2018         RE: Ishmael Whitman  [address]  [place] [address]        Dear Colleague,    Thank you for referring your patient, Ishmael Whitman, to the [facility]. Please see a copy of my visit note below.     Current Eye Medications:  None     Subjective:  Here for preop left eye. Used CatarActive3 drops for the right eye, would like to do this again. 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  Color blind, ongoing joke at the office     Objective:  See Ophthalmology Exam.       Assessment:  Ishmael Whitman is a 63 year old male who presents with:   Encounter Diagnoses   Name Primary?     Combined form of age-related cataract, left eye Dil 6.5, CA3, Target mostly distance left eye.      Pseudophakia of right eye      Posterior vitreous detachment of right eye        Plan:  PRE-OP CATARACT INSTRUCTIONS    *Use the following drops in the left eye 4 times on the day before surgery and once the morning of surgery:                                 CatarActive3    *Please bring all your eyedrops to the surgery center.  *No solid food or drink after midnight. Please take the starred medications with a small sip of water the morning of surgery.  *If you are diabetic, please do not take any diabetic medications the morning of surgery.    Jordana Vazquez MD  [phone]      Again, thank you for allowing me to participate in the care of your patient.        Sincerely,        Jordana Vazquez MD

## 2019-01-13 ENCOUNTER — ANESTHESIA EVENT (OUTPATIENT)
Dept: SURGERY | Facility: AMBULATORY SURGERY CENTER | Age: 64
End: 2019-01-13

## 2019-01-14 ENCOUNTER — ANESTHESIA (OUTPATIENT)
Dept: SURGERY | Facility: AMBULATORY SURGERY CENTER | Age: 64
End: 2019-01-14
Payer: COMMERCIAL

## 2019-01-14 ENCOUNTER — HOSPITAL ENCOUNTER (OUTPATIENT)
Facility: AMBULATORY SURGERY CENTER | Age: 64
Discharge: HOME | End: 2019-01-14
Attending: STUDENT IN AN ORGANIZED HEALTH CARE EDUCATION/TRAINING PROGRAM | Admitting: STUDENT IN AN ORGANIZED HEALTH CARE EDUCATION/TRAINING PROGRAM
Payer: COMMERCIAL

## 2019-01-14 VITALS
RESPIRATION RATE: 18 BRPM | DIASTOLIC BLOOD PRESSURE: 71 MMHG | SYSTOLIC BLOOD PRESSURE: 113 MMHG | OXYGEN SATURATION: 97 % | TEMPERATURE: 98.7 F

## 2019-01-14 PROCEDURE — G8918 PT W/O PREOP ORDER IV AB PRO: HCPCS

## 2019-01-14 PROCEDURE — G8907 PT DOC NO EVENTS ON DISCHARG: HCPCS

## 2019-01-14 PROCEDURE — 66984 XCAPSL CTRC RMVL W/O ECP: CPT | Mod: LT

## 2019-01-14 PROCEDURE — 66984 XCAPSL CTRC RMVL W/O ECP: CPT | Mod: LT | Performed by: STUDENT IN AN ORGANIZED HEALTH CARE EDUCATION/TRAINING PROGRAM

## 2019-01-14 DEVICE — EYE IMP IOL AMO PCL TECNIS ZCB00 18.0: Type: IMPLANTABLE DEVICE | Site: EYE | Status: FUNCTIONAL

## 2019-01-14 RX ORDER — ONDANSETRON 2 MG/ML
4 INJECTION INTRAMUSCULAR; INTRAVENOUS EVERY 30 MIN PRN
Status: DISCONTINUED | OUTPATIENT
Start: 2019-01-14 | End: 2019-01-15 | Stop reason: HOSPADM

## 2019-01-14 RX ORDER — LIDOCAINE 40 MG/G
CREAM TOPICAL
Status: DISCONTINUED | OUTPATIENT
Start: 2019-01-14 | End: 2019-01-15 | Stop reason: HOSPADM

## 2019-01-14 RX ORDER — OXYCODONE HYDROCHLORIDE 5 MG/1
5-10 TABLET ORAL EVERY 4 HOURS PRN
Status: DISCONTINUED | OUTPATIENT
Start: 2019-01-14 | End: 2019-01-15 | Stop reason: HOSPADM

## 2019-01-14 RX ORDER — NALOXONE HYDROCHLORIDE 0.4 MG/ML
.1-.4 INJECTION, SOLUTION INTRAMUSCULAR; INTRAVENOUS; SUBCUTANEOUS
Status: DISCONTINUED | OUTPATIENT
Start: 2019-01-14 | End: 2019-01-15 | Stop reason: HOSPADM

## 2019-01-14 RX ORDER — HYDROMORPHONE HYDROCHLORIDE 1 MG/ML
.3-.5 INJECTION, SOLUTION INTRAMUSCULAR; INTRAVENOUS; SUBCUTANEOUS EVERY 10 MIN PRN
Status: DISCONTINUED | OUTPATIENT
Start: 2019-01-14 | End: 2019-01-15 | Stop reason: HOSPADM

## 2019-01-14 RX ORDER — ONDANSETRON 4 MG/1
4 TABLET, ORALLY DISINTEGRATING ORAL EVERY 30 MIN PRN
Status: DISCONTINUED | OUTPATIENT
Start: 2019-01-14 | End: 2019-01-15 | Stop reason: HOSPADM

## 2019-01-14 RX ORDER — MOXIFLOXACIN 5 MG/ML
SOLUTION/ DROPS OPHTHALMIC PRN
Status: DISCONTINUED | OUTPATIENT
Start: 2019-01-14 | End: 2019-01-14 | Stop reason: HOSPADM

## 2019-01-14 RX ORDER — CYCLOPENTOLATE HYDROCHLORIDE 10 MG/ML
1 SOLUTION/ DROPS OPHTHALMIC
Status: COMPLETED | OUTPATIENT
Start: 2019-01-14 | End: 2019-01-14

## 2019-01-14 RX ORDER — TETRACAINE HYDROCHLORIDE 5 MG/ML
1 SOLUTION OPHTHALMIC ONCE
Status: COMPLETED | OUTPATIENT
Start: 2019-01-14 | End: 2019-01-14

## 2019-01-14 RX ORDER — FENTANYL CITRATE 50 UG/ML
25-50 INJECTION, SOLUTION INTRAMUSCULAR; INTRAVENOUS
Status: DISCONTINUED | OUTPATIENT
Start: 2019-01-14 | End: 2019-01-15 | Stop reason: HOSPADM

## 2019-01-14 RX ORDER — KETOROLAC TROMETHAMINE 30 MG/ML
30 INJECTION, SOLUTION INTRAMUSCULAR; INTRAVENOUS EVERY 6 HOURS PRN
Status: DISCONTINUED | OUTPATIENT
Start: 2019-01-14 | End: 2019-01-15 | Stop reason: HOSPADM

## 2019-01-14 RX ORDER — MEPERIDINE HYDROCHLORIDE 25 MG/ML
12.5 INJECTION INTRAMUSCULAR; INTRAVENOUS; SUBCUTANEOUS
Status: DISCONTINUED | OUTPATIENT
Start: 2019-01-14 | End: 2019-01-15 | Stop reason: HOSPADM

## 2019-01-14 RX ORDER — ALBUTEROL SULFATE 0.83 MG/ML
2.5 SOLUTION RESPIRATORY (INHALATION) EVERY 4 HOURS PRN
Status: DISCONTINUED | OUTPATIENT
Start: 2019-01-14 | End: 2019-01-15 | Stop reason: HOSPADM

## 2019-01-14 RX ORDER — SODIUM CHLORIDE, SODIUM LACTATE, POTASSIUM CHLORIDE, CALCIUM CHLORIDE 600; 310; 30; 20 MG/100ML; MG/100ML; MG/100ML; MG/100ML
INJECTION, SOLUTION INTRAVENOUS CONTINUOUS
Status: DISCONTINUED | OUTPATIENT
Start: 2019-01-14 | End: 2019-01-15 | Stop reason: HOSPADM

## 2019-01-14 RX ORDER — DEXAMETHASONE SODIUM PHOSPHATE 4 MG/ML
4 INJECTION, SOLUTION INTRA-ARTICULAR; INTRALESIONAL; INTRAMUSCULAR; INTRAVENOUS; SOFT TISSUE EVERY 10 MIN PRN
Status: DISCONTINUED | OUTPATIENT
Start: 2019-01-14 | End: 2019-01-15 | Stop reason: HOSPADM

## 2019-01-14 RX ORDER — ACETAMINOPHEN 325 MG/1
975 TABLET ORAL ONCE
Status: COMPLETED | OUTPATIENT
Start: 2019-01-14 | End: 2019-01-14

## 2019-01-14 RX ORDER — TROPICAMIDE 10 MG/ML
1 SOLUTION/ DROPS OPHTHALMIC
Status: COMPLETED | OUTPATIENT
Start: 2019-01-14 | End: 2019-01-14

## 2019-01-14 RX ORDER — TETRACAINE HYDROCHLORIDE 5 MG/ML
SOLUTION OPHTHALMIC PRN
Status: DISCONTINUED | OUTPATIENT
Start: 2019-01-14 | End: 2019-01-14 | Stop reason: HOSPADM

## 2019-01-14 RX ORDER — PROPARACAINE HYDROCHLORIDE 5 MG/ML
1 SOLUTION/ DROPS OPHTHALMIC ONCE
Status: DISCONTINUED | OUTPATIENT
Start: 2019-01-14 | End: 2019-01-15 | Stop reason: HOSPADM

## 2019-01-14 RX ORDER — PHENYLEPHRINE HYDROCHLORIDE 25 MG/ML
1 SOLUTION/ DROPS OPHTHALMIC
Status: COMPLETED | OUTPATIENT
Start: 2019-01-14 | End: 2019-01-14

## 2019-01-14 RX ADMIN — PHENYLEPHRINE HYDROCHLORIDE 1 DROP: 25 SOLUTION/ DROPS OPHTHALMIC at 08:10

## 2019-01-14 RX ADMIN — TETRACAINE HYDROCHLORIDE 1 DROP: 5 SOLUTION OPHTHALMIC at 07:59

## 2019-01-14 RX ADMIN — FENTANYL CITRATE 25 MCG: 50 INJECTION, SOLUTION INTRAMUSCULAR; INTRAVENOUS at 08:35

## 2019-01-14 RX ADMIN — CYCLOPENTOLATE HYDROCHLORIDE 1 DROP: 10 SOLUTION/ DROPS OPHTHALMIC at 08:10

## 2019-01-14 RX ADMIN — TROPICAMIDE 1 DROP: 10 SOLUTION/ DROPS OPHTHALMIC at 08:05

## 2019-01-14 RX ADMIN — PHENYLEPHRINE HYDROCHLORIDE 1 DROP: 25 SOLUTION/ DROPS OPHTHALMIC at 08:00

## 2019-01-14 RX ADMIN — CYCLOPENTOLATE HYDROCHLORIDE 1 DROP: 10 SOLUTION/ DROPS OPHTHALMIC at 08:00

## 2019-01-14 RX ADMIN — CYCLOPENTOLATE HYDROCHLORIDE 1 DROP: 10 SOLUTION/ DROPS OPHTHALMIC at 08:05

## 2019-01-14 RX ADMIN — TROPICAMIDE 1 DROP: 10 SOLUTION/ DROPS OPHTHALMIC at 08:00

## 2019-01-14 RX ADMIN — ACETAMINOPHEN 975 MG: 325 TABLET ORAL at 08:01

## 2019-01-14 RX ADMIN — TROPICAMIDE 1 DROP: 10 SOLUTION/ DROPS OPHTHALMIC at 08:10

## 2019-01-14 RX ADMIN — PHENYLEPHRINE HYDROCHLORIDE 1 DROP: 25 SOLUTION/ DROPS OPHTHALMIC at 08:05

## 2019-01-14 NOTE — ANESTHESIA CARE TRANSFER NOTE
Patient: Ishmael Whitman    Procedure(s):  PHACOEMULSIFICATION WITH STANDARD INTRAOCULAR LENS IMPLANT, left    Diagnosis: Left Cataract  Diagnosis Additional Information: No value filed.    Anesthesia Type:   MAC     Note:  Airway :Room Air  Patient transferred to:Phase II  Comments: To Phase II. Report to RN.  VSS Resp status stable.Handoff Report: Identifed the Patient, Identified the Reponsible Provider, Reviewed the pertinent medical history, Discussed the surgical course, Reviewed Intra-OP anesthesia mangement and issues during anesthesia, Set expectations for post-procedure period and Allowed opportunity for questions and acknowledgement of understanding      Vitals: (Last set prior to Anesthesia Care Transfer)    CRNA VITALS  1/14/2019 0828 - 1/14/2019 0902      1/14/2019             Pulse:  64    SpO2:  99 %                Electronically Signed By: YURI Sanders CRNA  January 14, 2019  9:02 AM

## 2019-01-14 NOTE — OP NOTE
PreOp Diagnosis: Visually significant nuclear sclerotic cataract left eye  PostOp Diagnosis: Same  Surgeon: Jordana Vazquez MD  Implant: Technis [id number]    Procedures:   1. Review of intraocular lens calculations, both eyes   2. Phacoemulsification and extraction of lens  left eye   3. Intraocular lens implantation left eye  Anesthesia: MAC/topical  Complications: None  EBL: <1cc    Ishmael Whitman suffers from a visually significant cataract of the left eye. This has caused problems with distance and reading vision, including glare. After discussing the risks, benefits, and alternatives, the patient wishes to proceed with cataract surgery.    The patient was identified in the pre-op area where the left eye was marked. The patient was then brought to the operating room where a time out was called, identifying the patient, the procedure, and the correct site. Tetracaine drops were applied to both eyes. The operative eye was then prepped and draped in the usual sterile ophthalmic fashion. An eyelid speculum was placed into the operative eye. Additional tetracaine drops were applied. A paracentesis was made inferior with a side port blade. Due to poor red reflex, trypan blue was irrigated into the anterior chamber to enhance capsular visualization.  This was irrigated from the anterior chamber after 30 seconds with  1% preservative-free lidocaine and epinephrine.   Endocoat was injected to deepen the anterior chamber. A 2.4mm clear corneal wound was created with a keratome blade temporally. A continuous curvilinear capsulorrhexis was started with a bent cystitome and completed with Utrada forceps. Hydrodissection of the lens nucleus was performed with BSS on a cannula. The lens nucleus was rotated. Phacoemulsification of the lens nucleus was accomplished in a phacoemulsification stop and chop technique. Remaining cortex was removed with irrigation and aspiration. The lens capsule was noted to be intact. Ulysses was  used to inflate the capsular bag.  The lens was injected into the capsular bag. Remaining viscoelastic was removed with irrigation and aspiration. The wounds were checked and found to be watertight after hydration. The eyelid speculum was removed and Vigamox drops were placed into the operative eye. The patient tolerated the procedure well and was in stable condition on the way to the recovery area.     Jordana Vazquez MD

## 2019-01-14 NOTE — ANESTHESIA PREPROCEDURE EVALUATION
Anesthesia Pre-Procedure Evaluation    Patient: Ishmael Whitman   MRN:     [id number] Gender:   male   Age:    63 year old :      1955        Preoperative Diagnosis: Left Cataract   Procedure(s):  PHACOEMULSIFICATION WITH STANDARD INTRAOCULAR LENS IMPLANT, left     Past Medical History:   Diagnosis Date     DJD (degenerative joint disease)     knees     HTN      Nonsenile cataract      Pure hypercholesterolemia       Past Surgical History:   Procedure Laterality Date     CATARACT IOL, RT/LT       COLONOSCOPY  10/28/2011    Procedure:COLONOSCOPY; Colonoscopy, history of polyps, anemia; Surgeon:RIOS NOVA; Location: OR     ORTHOPEDIC SURGERY  10.20.14    right hip replacement     PHACOEMULSIFICATION CLEAR CORNEA WITH STANDARD INTRAOCULAR LENS IMPLANT Right 2017    Procedure: PHACOEMULSIFICATION CLEAR CORNEA WITH STANDARD INTRAOCULAR LENS IMPLANT;  RIGHT PHACOEMULSIFICATION CLEAR CORNEA WITH STANDARD INTRAOCULAR LENS IMPLANT ;  Surgeon: Jordana Vazquez MD;  Location:  [facility]     VASECTOMY            Anesthesia Evaluation     . Pt has had prior anesthetic. Type: General and MAC    No history of anesthetic complications          ROS/MED HX    ENT/Pulmonary:  - neg pulmonary ROS     Neurologic:  - neg neurologic ROS     Cardiovascular:     (+) Dyslipidemia, hypertension----. : . . . :. .       METS/Exercise Tolerance:     Hematologic:  - neg hematologic  ROS       Musculoskeletal:  - neg musculoskeletal ROS       GI/Hepatic:  - neg GI/hepatic ROS       Renal/Genitourinary:         Endo:  - neg endo ROS       Psychiatric:  - neg psychiatric ROS       Infectious Disease:  - neg infectious disease ROS       Malignancy:      - no malignancy   Other:    - neg other ROS                     PHYSICAL EXAM:   Mental Status/Neuro: A/A/O   Airway: Facies: Feasible  Mallampati: I  Mouth/Opening: Full  TM distance: > 6 cm  Neck ROM: Full   Respiratory: Auscultation: CTAB     Resp. Rate: Normal     Resp. Effort:  "Normal      CV: Rhythm: Regular  Rate: Age appropriate  Heart: Normal Sounds   Comments:      Dental: Normal                  Lab Results   Component Value Date    WBC 6.5 05/04/2017    HGB 12.4 (L) 05/04/2017    HCT 37.7 (L) 05/04/2017     05/04/2017    CRP 73.3 (H) 04/20/2009    SED 34 (H) 04/20/2009     (L) 01/08/2018    POTASSIUM 4.2 01/08/2018    CHLORIDE 96 01/08/2018    CO2 24 01/08/2018    BUN 19 01/08/2018    CR 1.06 01/08/2018    GLC 85 01/08/2018    BRANDON 8.9 01/08/2018    PHOS 2.9 05/04/2017    MAG 2.1 04/20/2009    ALBUMIN 3.9 01/08/2018    PROTTOTAL 8.6 01/08/2018    ALT 28 01/08/2018    AST 25 01/08/2018    ALKPHOS 113 01/08/2018    BILITOTAL 0.9 01/08/2018    LIPASE 107 06/08/2009    INR 3.0 (A) 11/12/2014    TSH 3.25 01/08/2018    T4 1.16 08/04/2010    T3 137 08/04/2010       Preop Vitals  BP Readings from Last 3 Encounters:   01/08/18 140/80   05/17/17 139/75   05/04/17 130/80    Pulse Readings from Last 3 Encounters:   01/08/18 76   05/04/17 68   06/09/16 70      Resp Readings from Last 3 Encounters:   01/10/18 16   05/17/17 16   12/17/15 14    SpO2 Readings from Last 3 Encounters:   01/08/18 98%   05/17/17 98%   05/04/17 97%      Temp Readings from Last 1 Encounters:   01/08/18 98.5  F (36.9  C) (Oral)    Ht Readings from Last 1 Encounters:   01/10/18 1.727 m (5' 8\")      Wt Readings from Last 1 Encounters:   01/10/18 85.7 kg (189 lb)    Estimated body mass index is 28.74 kg/m  as calculated from the following:    Height as of 1/10/18: 1.727 m (5' 8\").    Weight as of 1/10/18: 85.7 kg (189 lb).     LDA:  Peripheral IV 05/17/17 Left Hand (Active)   Number of days: 607            Assessment:   ASA SCORE: 2    Will be NPO appropriate at: 1/14/2019 8:17 AM   Documentation: H&P complete; Preop Testing complete; Consents complete   Proceeding: Proceed without further delay  Tobacco Use:  NO Active use of Tobacco/UNKNOWN Tobacco use status     Plan:   Anes. Type:  MAC   Pre-Induction: " Midazolam IV; Acetaminophen PO   Induction:  Not applicable   Airway: Native Airway   Access/Monitoring: PIV   Maintenance: N/a   Emergence: N/a   Logistics: Same Day Surgery     Postop Pain/Sedation Strategy:  Standard-Options: Opioids PRN     PONV Management:  Adult Risk Factors:, Non-Smoker, Postop Opioids  Prevention: Ondansetron     CONSENT: Direct conversation   Plan and risks discussed with: Patient; Spouse   Blood Products: Consent Deferred (Minimal Blood Loss)                         Den Mckeon MD

## 2019-01-14 NOTE — ANESTHESIA POSTPROCEDURE EVALUATION
Anesthesia POST Procedure Evaluation    Patient: Ishmael Whitman   MRN:     [id number] Gender:   male   Age:    63 year old :      1955        Preoperative Diagnosis: Left Cataract   Procedure(s):  PHACOEMULSIFICATION WITH STANDARD INTRAOCULAR LENS IMPLANT, left   Postop Comments: No value filed.       Anesthesia Type:  MAC    Reportable Event: NO     PAIN: Uncomplicated   Sign Out status: Comfortable, Well controlled pain     PONV: No PONV   Sign Out status:  No Nausea or Vomiting     Neuro/Psych: Uneventful perioperative course   Sign Out Status: Preoperative baseline; Age appropriate mentation     Airway/Resp.: Uneventful perioperative course   Sign Out Status: Non labored breathing, age appropriate RR; Resp. Status within EXPECTED Parameters     CV: Uneventful perioperative course   Sign Out status: Appropriate BP and perfusion indices; Appropriate HR/Rhythm     Disposition:   Sign Out in:  Phase II  Recovery Course: Uneventful  Follow-Up: Not required           Last Anesthesia Record Vitals:  CRNA VITALS  2019 0828 - 2019 0927      2019             Pulse:  64    SpO2:  99 %          Last PACU/Preop Vitals:  Vitals:    19 0800 19 0900   BP: 160/83 118/67   Resp: 16 16   Temp: 98.9  F (37.2  C) 98.7  F (37.1  C)   SpO2: 97% 97%         Electronically Signed By: Den Mckeon MD, 2019, 9:27 AM

## 2019-01-14 NOTE — DISCHARGE INSTRUCTIONS
CATARACT SURGERY POST-OP INSTRUCTIONS  Dr. Jordana Vazquez  [phone]      *Continue using CatarActive3 four times a day in the operative eye.  *You should get 3 doses in today and 4 doses daily starting tomorrow.       Keep the eye shield taped in place unless putting drops in. We will remove it for you in the office tomorrow.      Light sensitivity may be noticed. Sunglasses may be worn for comfort.      Do not rub the operated eye.      Keep the operated eye dry. You may wash your hair, bathe or shower, but keep the operated eye closed while doing so.       No swimming, hot tub, or sauna for 2 weeks.      No make up around eye for 5 days.      No bending at the waist or lifting more than 10 pounds for one week.      May take Tylenol (per directions on bottle) for mild pain.      Call the office at [phone] and ask to speak to the on-call ophthalmologist  if any of the following should occur:  o Any sudden vision changes  o Nausea or severe headache  o Increase in pain not controlled  o Or signs of infection (pus, increasing redness or tenderness)  [facility]  Same-Day Surgery   Adult Discharge Orders & Instructions   For 24 hours after surgery  1. Get plenty of rest.  A responsible adult must stay with you for at least 24 hours after you leave the hospital.   2. Do not drive or use heavy equipment.  If you have weakness or tingling, don't drive or use heavy equipment until this feeling goes away.  3. Do not drink alcohol.  4. Avoid strenuous or risky activities.  Ask for help when climbing stairs.   5. You may feel lightheaded.  IF so, sit for a few minutes before standing.  Have someone help you get up.   6. If you have nausea (feel sick to your stomach): Drink only clear liquids such as apple juice, ginger ale, broth or 7-Up.  Rest may also help.  Be sure to drink enough fluids.  Move to a regular diet as you feel able.  7. You may have a slight fever. Call the doctor if your  fever is over 100 F (37.7 C) (taken under the tongue) or lasts longer than 24 hours.  8. You may have a dry mouth, a sore throat, muscle aches or trouble sleeping.  These should go away after 24 hours.  9. Do not make important or legal decisions.   Call your doctor for any of the followin.  Signs of infection (fever, growing tenderness at the surgery site, a large amount of drainage or bleeding, severe pain, foul-smelling drainage, redness, swelling).    2. It has been over 8 to 10 hours since surgery and you are still not able to urinate (pass water).    3.  Headache for over 24 hours.    4.  Numbness, tingling or weakness the day after surgery (if you had spinal anesthesia).  To contact a doctor, call ___________________________

## 2019-01-15 ENCOUNTER — OFFICE VISIT (OUTPATIENT)
Dept: OPHTHALMOLOGY | Facility: CLINIC | Age: 64
End: 2019-01-15
Payer: COMMERCIAL

## 2019-01-15 DIAGNOSIS — Z96.1 PSEUDOPHAKIA OF BOTH EYES: Primary | ICD-10-CM

## 2019-01-15 PROCEDURE — 99024 POSTOP FOLLOW-UP VISIT: CPT | Performed by: STUDENT IN AN ORGANIZED HEALTH CARE EDUCATION/TRAINING PROGRAM

## 2019-01-15 ASSESSMENT — EXTERNAL EXAM - RIGHT EYE: OD_EXAM: NORMAL

## 2019-01-15 ASSESSMENT — EXTERNAL EXAM - LEFT EYE: OS_EXAM: NORMAL

## 2019-01-15 ASSESSMENT — TONOMETRY
OS_IOP_MMHG: 18
IOP_METHOD: APPLANATION

## 2019-01-15 ASSESSMENT — VISUAL ACUITY
METHOD: SNELLEN - LINEAR
OS_SC: 20/20

## 2019-01-15 ASSESSMENT — SLIT LAMP EXAM - LIDS
COMMENTS: NORMAL
COMMENTS: NORMAL

## 2019-01-15 NOTE — PATIENT INSTRUCTIONS
POST-OP CATARACT INSTRUCTIONS    *   Use the following drop(s) in the LEFT EYE four times a day:        CatarActive 3    *   Wear eye shield when sleeping for one week.    *   No eye rubbing or lifting more than 10 pounds for one week.    *   Keep water out of eye for two weeks.    *   OK to resume aspirin and/or other blood thinners.    *   Return as scheduled in about one week.    *   If your vision worsens, eye becomes increasingly red, or becomes painful, call [phone].     Jordana Vazquez M.D.

## 2019-01-15 NOTE — PROGRESS NOTES
Current Eye Medications:  CatarActive 3 left eye twice yesterday after getting home, and once so far today.       Subjective:  Status/Post Kelman Phacoemulsification with Posterior Chamber Lens left eye:  1-14-19.  Slept well.  He took a Tylenol last evening secondary to some scratchiness.  Vision is good.  He would like to change his Final appointment.     Objective:  See Ophthalmology Exam.      Assessment:  Ishmael Whitman is a 63 year old male who presents with:   Encounter Diagnosis   Name Primary?     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        Plan:  POST-OP CATARACT INSTRUCTIONS    *   Use the following drop(s) in the LEFT EYE four times a day:        CatarActive 3  *   Wear eye shield when sleeping for one week.  *   No eye rubbing or lifting more than 10 pounds for one week.  *   Keep water out of eye for two weeks.  *   OK to resume aspirin and/or other blood thinners.  *   Return as scheduled in about one week.  *   If your vision worsens, eye becomes increasingly red, or becomes painful, call [phone].     Jordana Vazquez M.D.

## 2019-01-15 NOTE — LETTER
1/15/2019         RE: Ishmael Whitman  [address]  [facility] [address]        Dear Colleague,    Thank you for referring your patient, Ishmael Whitman, to the [facility].     He is doing well after cataract surgery in the left eye. Please see a copy of my visit note below.     Current Eye Medications:  CatarActive 3 left eye twice yesterday after getting home, and once so far today.       Subjective:  Status/Post Kelman Phacoemulsification with Posterior Chamber Lens left eye:  1-14-19.  Slept well.  He took a Tylenol last evening secondary to some scratchiness.  Vision is good.  He would like to change his Final appointment.     Objective:  See Ophthalmology Exam.      Assessment:  Ishmael Whitman is a 63 year old male who presents with:   Encounter Diagnosis   Name Primary?     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        Plan:  POST-OP CATARACT INSTRUCTIONS    *   Use the following drop(s) in the LEFT EYE four times a day:        CatarActive 3  *   Wear eye shield when sleeping for one week.  *   No eye rubbing or lifting more than 10 pounds for one week.  *   Keep water out of eye for two weeks.  *   OK to resume aspirin and/or other blood thinners.  *   Return as scheduled in about one week.  *   If your vision worsens, eye becomes increasingly red, or becomes painful, call [phone].     Jordana Vazquez M.D.      Again, thank you for allowing me to participate in the care of your patient.        Sincerely,        Jordana Vazquez MD

## 2019-01-16 ENCOUNTER — DOCUMENTATION ONLY (OUTPATIENT)
Dept: OPHTHALMOLOGY | Facility: CLINIC | Age: 64
End: 2019-01-16

## 2019-01-22 ENCOUNTER — OFFICE VISIT (OUTPATIENT)
Dept: OPHTHALMOLOGY | Facility: CLINIC | Age: 64
End: 2019-01-22
Payer: COMMERCIAL

## 2019-01-22 DIAGNOSIS — Z96.1 PSEUDOPHAKIA OF BOTH EYES: Primary | ICD-10-CM

## 2019-01-22 PROCEDURE — 99024 POSTOP FOLLOW-UP VISIT: CPT | Performed by: STUDENT IN AN ORGANIZED HEALTH CARE EDUCATION/TRAINING PROGRAM

## 2019-01-22 ASSESSMENT — VISUAL ACUITY
METHOD: SNELLEN - LINEAR
OS_SC: 20/25-1

## 2019-01-22 ASSESSMENT — TONOMETRY
OS_IOP_MMHG: 15
IOP_METHOD: APPLANATION

## 2019-01-22 ASSESSMENT — SLIT LAMP EXAM - LIDS
COMMENTS: NORMAL
COMMENTS: NORMAL

## 2019-01-22 ASSESSMENT — REFRACTION_MANIFEST
OS_CYLINDER: SPHERE
OS_SPHERE: +0.50

## 2019-01-22 ASSESSMENT — EXTERNAL EXAM - LEFT EYE: OS_EXAM: NORMAL

## 2019-01-22 ASSESSMENT — EXTERNAL EXAM - RIGHT EYE: OD_EXAM: NORMAL

## 2019-01-22 NOTE — PROGRESS NOTES
Current Eye Medications:  cataractive 3 four times a day left eye     Subjective:  Po2 left eye   Pt reports sees well and this eye feels fine.     Objective:  See Ophthalmology Exam.      Assessment:  Ishmael Whitman is a 63 year old male who presents with:   Encounter Diagnosis   Name Primary?     Pseudophakia of both eyes PO2 left eye, doing well.        Plan:  *   For the left eye, continue CatarActive3 four times a day until the bottle runs out.  *   Stop wearing eye shield.  *   No eye rubbing. May resume heavy lifting.  *   If you are taking glaucoma drops, continue as usual.  *   Return as scheduled for final exam with glasses check.  *   If your vision worsens, eye becomes increasingly red, or becomes painful, call [phone].    Jordana Vazquez M.D.

## 2019-01-22 NOTE — LETTER
1/22/2019         RE: Ishmael Whitman  [address]  [facility] [address]        Dear Colleague,    Thank you for referring your patient, Ishmael Whitman, to the [facility].     He is doing well after cataract surgery in the left eye. Please see a copy of my visit note below.     Current Eye Medications:  cataractive 3 four times a day left eye     Subjective:  Po2 left eye   Pt reports sees well and this eye feels fine.     Objective:  See Ophthalmology Exam.      Assessment:  Ishmael Whitman is a 63 year old male who presents with:   Encounter Diagnosis   Name Primary?     Pseudophakia of both eyes PO2 left eye, doing well.        Plan:  *   For the left eye, continue CatarActive3 four times a day until the bottle runs out.  *   Stop wearing eye shield.  *   No eye rubbing. May resume heavy lifting.  *   If you are taking glaucoma drops, continue as usual.  *   Return as scheduled for final exam with glasses check.  *   If your vision worsens, eye becomes increasingly red, or becomes painful, call [phone].    Jordana Vazquez M.D.        Again, thank you for allowing me to participate in the care of your patient.        Sincerely,        Jordana Vazquez MD

## 2019-01-22 NOTE — PATIENT INSTRUCTIONS
*   For the left eye, continue CatarActive3 four times a day until the bottle runs out.    *   Stop wearing eye shield.    *   No eye rubbing. May resume heavy lifting.    *   If you are taking glaucoma drops, continue as usual.    *   Return as scheduled for final exam with glasses check.    *   If your vision worsens, eye becomes increasingly red, or becomes painful, call [phone].    Jordana Vazquez M.D.

## 2019-02-11 ENCOUNTER — OFFICE VISIT (OUTPATIENT)
Dept: OPHTHALMOLOGY | Facility: CLINIC | Age: 64
End: 2019-02-11
Payer: COMMERCIAL

## 2019-02-11 DIAGNOSIS — Z96.1 PSEUDOPHAKIA OF BOTH EYES: Primary | ICD-10-CM

## 2019-02-11 DIAGNOSIS — H43.811 POSTERIOR VITREOUS DETACHMENT OF RIGHT EYE: ICD-10-CM

## 2019-02-11 PROCEDURE — 99024 POSTOP FOLLOW-UP VISIT: CPT | Performed by: STUDENT IN AN ORGANIZED HEALTH CARE EDUCATION/TRAINING PROGRAM

## 2019-02-11 ASSESSMENT — REFRACTION_WEARINGRX
SPECS_TYPE: OTC READERS
OS_CYLINDER: SPHERE
OS_SPHERE: +2.00
OD_CYLINDER: SPHERE
OD_SPHERE: +2.00

## 2019-02-11 ASSESSMENT — EXTERNAL EXAM - RIGHT EYE: OD_EXAM: NORMAL

## 2019-02-11 ASSESSMENT — REFRACTION_MANIFEST
OS_AXIS: 129
OS_CYLINDER: +0.25
OS_ADD: +2.50
OS_SPHERE: PLANO

## 2019-02-11 ASSESSMENT — VISUAL ACUITY
OS_SC: 20/20
METHOD: SNELLEN - LINEAR

## 2019-02-11 ASSESSMENT — TONOMETRY
IOP_METHOD: APPLANATION
OS_IOP_MMHG: 13

## 2019-02-11 ASSESSMENT — SLIT LAMP EXAM - LIDS
COMMENTS: NORMAL
COMMENTS: NORMAL

## 2019-02-11 ASSESSMENT — CUP TO DISC RATIO: OS_RATIO: 0.2

## 2019-02-11 ASSESSMENT — EXTERNAL EXAM - LEFT EYE: OS_EXAM: NORMAL

## 2019-02-11 NOTE — PROGRESS NOTES
Current Eye Medications:  CatarActive 3 left eye, finished a couple of days ago     Subjective:Here for final MR left eye. Doing very well, seeing well.      Objective:  See Ophthalmology Exam.      Assessment:  Ishmael Whitman is a 64 year old male who presents with:   Encounter Diagnoses   Name Primary?     Pseudophakia of both eyes Final MR left eye, doing well.      Posterior vitreous detachment of right eye        Plan:  *  Discontinue all drops, unless used prior to cataract surgery.  *  Fill prescription for new glasses or drugstore readers.  *  Return to clinic in 1 year for a complete eye exam or earlier if problems should arise.    Jordana Vazquez M.D.  [phone]

## 2019-02-11 NOTE — LETTER
2/11/2019      RE: Ishmael Whitman  [address]  [facility] [address]    Dear Colleague,    Thank you for referring your patient, Ishmael Whitman, to the [facility].     He is doing well after cataract surgery in the left eye. Please see a copy of my visit note below.     Current Eye Medications:  CatarActive 3 left eye, finished a couple of days ago     Subjective:Here for final MR left eye. Doing very well, seeing well.      Objective:  See Ophthalmology Exam.      Assessment:  Ishmael Whitman is a 64 year old male who presents with:   Encounter Diagnoses   Name Primary?     Pseudophakia of both eyes Final MR left eye, doing well.      Posterior vitreous detachment of right eye        Plan:  *  Discontinue all drops, unless used prior to cataract surgery.  *  Fill prescription for new glasses or drugstore readers.  *  Return to clinic in 1 year for a complete eye exam or earlier if problems should arise.    Jordana Vazquez M.D.  [phone]      Again, thank you for allowing me to participate in the care of your patient.        Sincerely,        Jordana Vazquez MD

## 2019-02-11 NOTE — PATIENT INSTRUCTIONS
*  Discontinue all drops, unless used prior to cataract surgery.    *  Fill prescription for new glasses or drugstore readers.    *  Return to clinic in 1 year for a complete eye exam or earlier if problems should arise.    Jordana Vazquez M.D.  [phone]

## 2022-05-25 NOTE — TELEPHONE ENCOUNTER
Monitor: The problem is unchanged.  Evaluation: No labs/tests required today.  Assessment/Treatment:  Continue current treatment/monitoring regimen.   Sent [employer] message.Deborah Alvarez MA/PHOEBE

## (undated) DEVICE — GLOVE PROTEXIS W/NEU-THERA 7.0  2D73TE70

## (undated) DEVICE — GLOVE PROTEXIS MICRO 7.0  2D73PM70

## (undated) DEVICE — EYE PACK CUSTOM ANTERIOR 30DEG TIP CENTURION PPK6682-04

## (undated) DEVICE — EYE PACK CUSTOM CATARACT AS12127-01

## (undated) DEVICE — EYE PACK BVI READYPAK KIT #1

## (undated) DEVICE — GLOVE PROTEXIS MICRO 6.5  2D73PM65

## (undated) DEVICE — SOL WATER IRRIG 1000ML BOTTLE 07139-09

## (undated) DEVICE — EYE TIP IRRIGATION & ASPIRATION POLYMER 35D BENT 8065751511

## (undated) DEVICE — EYE CANN IRR 30GA  ANTERIOR CHAMBER 581273

## (undated) DEVICE — PACK CATARACT CUSTOM SO DALE SEY32CTFCX

## (undated) DEVICE — EYE KNIFE SLIT XSTAR VISITEC 2.4MM 45DEG BEVEL UP 373724

## (undated) DEVICE — EYE SHIELD PLASTIC

## (undated) DEVICE — EYE SOL BSS 500ML

## (undated) DEVICE — LINEN TOWEL PACK X5 5464

## (undated) RX ORDER — ACETAMINOPHEN 325 MG/1
TABLET ORAL
Status: DISPENSED
Start: 2019-01-14

## (undated) RX ORDER — FENTANYL CITRATE 50 UG/ML
INJECTION, SOLUTION INTRAMUSCULAR; INTRAVENOUS
Status: DISPENSED
Start: 2019-01-14